# Patient Record
Sex: FEMALE | Race: OTHER | HISPANIC OR LATINO
[De-identification: names, ages, dates, MRNs, and addresses within clinical notes are randomized per-mention and may not be internally consistent; named-entity substitution may affect disease eponyms.]

---

## 2021-07-27 ENCOUNTER — APPOINTMENT (OUTPATIENT)
Dept: ORTHOPEDIC SURGERY | Facility: CLINIC | Age: 61
End: 2021-07-27

## 2021-07-27 NOTE — HISTORY OF PRESENT ILLNESS
[de-identified] : Ms. REMINGTON CORNELIUS is a 60 year female who presents to office complaining of right knee pain.\par \par All review of systems, family history, social history, surgical history, past medical history, medications, and allergies not previously stated as positive are negative. They were reviewed by me today with the patient and documented accordingly.

## 2021-11-10 ENCOUNTER — APPOINTMENT (OUTPATIENT)
Dept: BARIATRICS | Facility: CLINIC | Age: 61
End: 2021-11-10
Payer: MEDICAID

## 2021-11-10 VITALS
OXYGEN SATURATION: 99 % | TEMPERATURE: 97.6 F | BODY MASS INDEX: 36.33 KG/M2 | SYSTOLIC BLOOD PRESSURE: 150 MMHG | DIASTOLIC BLOOD PRESSURE: 76 MMHG | HEIGHT: 62 IN | HEART RATE: 75 BPM | WEIGHT: 197.44 LBS

## 2021-11-10 DIAGNOSIS — E11.9 TYPE 2 DIABETES MELLITUS W/OUT COMPLICATIONS: ICD-10-CM

## 2021-11-10 DIAGNOSIS — E78.00 PURE HYPERCHOLESTEROLEMIA, UNSPECIFIED: ICD-10-CM

## 2021-11-10 DIAGNOSIS — I10 ESSENTIAL (PRIMARY) HYPERTENSION: ICD-10-CM

## 2021-11-10 DIAGNOSIS — K21.9 GASTRO-ESOPHAGEAL REFLUX DISEASE W/OUT ESOPHAGITIS: ICD-10-CM

## 2021-11-10 PROCEDURE — 99203 OFFICE O/P NEW LOW 30 MIN: CPT

## 2021-11-10 NOTE — REVIEW OF SYSTEMS
[Patient Intake Form Reviewed] : Patient intake form was reviewed [Reflux/Heartburn] : reflux/heartburn [Negative] : Psychiatric

## 2021-11-10 NOTE — ASSESSMENT
[FreeTextEntry1] : She meets the NIH criteria for bariatric surgery and would like to have a laparoscopic sleeve gastrectomy. I have reviewed the risks and benefits of the procedure with the patient, and she understands this information fully.

## 2021-11-10 NOTE — PHYSICAL EXAM
[Obese, well nourished, in no acute distress] : obese, well nourished, in no acute distress [Normal] : affect appropriate [de-identified] : obese

## 2021-11-10 NOTE — HISTORY OF PRESENT ILLNESS
[de-identified] : Patient is a 61 year old female with along history of morbid obesity not responsive to diet and exercise. She has a BMI of 36. 1 and weight-related comorbidities including hypertension, coronary artery disease, type 2 diabetes mellitus,GERD and hypercholesterolemia.

## 2021-12-13 ENCOUNTER — APPOINTMENT (OUTPATIENT)
Dept: BARIATRICS | Facility: CLINIC | Age: 61
End: 2021-12-13
Payer: MEDICAID

## 2021-12-13 PROCEDURE — 98968 PH1 ASSMT&MGMT NQHP 21-30: CPT

## 2022-05-31 ENCOUNTER — RESULT REVIEW (OUTPATIENT)
Age: 62
End: 2022-05-31

## 2022-08-18 ENCOUNTER — APPOINTMENT (OUTPATIENT)
Dept: BARIATRICS | Facility: CLINIC | Age: 62
End: 2022-08-18

## 2022-08-18 VITALS
DIASTOLIC BLOOD PRESSURE: 81 MMHG | HEART RATE: 60 BPM | TEMPERATURE: 96.5 F | WEIGHT: 196.5 LBS | BODY MASS INDEX: 36.16 KG/M2 | OXYGEN SATURATION: 99 % | HEIGHT: 62 IN | SYSTOLIC BLOOD PRESSURE: 143 MMHG

## 2022-08-18 DIAGNOSIS — E66.01 MORBID (SEVERE) OBESITY DUE TO EXCESS CALORIES: ICD-10-CM

## 2022-08-18 PROCEDURE — 99204 OFFICE O/P NEW MOD 45 MIN: CPT

## 2022-08-19 NOTE — HISTORY OF PRESENT ILLNESS
[de-identified] : Patient is a 61 year old F with PMHx of obesity(BMI 35), DM,HLD and HTN and PSHx of  who is here today feeling well for initial bariatric consult. Has seen  previously. She is on 60 units insulin QPM, metformin and losartan. C/o of daily heartburn and is not on Omeprazole. \par We discussed at length surgical and non-surgical options and that non surgical approaches are unlikely to lead to long term, sustained weight loss. We also discussed that surgery alone is unlikely to be successful but should rather be seen as a tool for weight loss to be integrated with physical activity and nutritional counseling. The patient verbalized understanding and agrees to proceed with the evaluation. All risks of surgery were explained to the patient including the risks of leaks, infections, blood clots and death.\par \par

## 2022-08-19 NOTE — ASSESSMENT
[FreeTextEntry1] : Patient is a 61 year old F with PMHx of obesity(BMI 35), DM,HLD and HTN and PSHx of  who is here today feeling well for initial bariatric consult. I have explained all the risks and benefits of all the options of bariatric surgery. At this time, patient will see the coordinators and begin the bariatric workup.

## 2022-08-19 NOTE — PLAN
[FreeTextEntry1] : Will begin bariatric workup including sleep study to assess obesity-related disordered breathing.\par \par

## 2022-08-19 NOTE — END OF VISIT
[FreeTextEntry3] : All medical record entries made by the Scribe were at my, NANDO York , direction and personally dictated by me on 08/18/2022 . I have reviewed the chart and agree that the record accurately reflects my personal performance of the history, physical exam, assessment and plan. I have also personally directed, reviewed, and agreed with the chart.\par  [Time Spent: ___ minutes] : I have spent [unfilled] minutes of time on the encounter.

## 2022-09-08 ENCOUNTER — APPOINTMENT (OUTPATIENT)
Dept: BARIATRICS | Facility: CLINIC | Age: 62
End: 2022-09-08

## 2022-09-21 ENCOUNTER — APPOINTMENT (OUTPATIENT)
Dept: RADIOLOGY | Facility: HOSPITAL | Age: 62
End: 2022-09-21

## 2022-09-21 ENCOUNTER — OUTPATIENT (OUTPATIENT)
Dept: OUTPATIENT SERVICES | Facility: HOSPITAL | Age: 62
LOS: 1 days | End: 2022-09-21
Payer: COMMERCIAL

## 2022-09-21 PROCEDURE — 74240 X-RAY XM UPR GI TRC 1CNTRST: CPT | Mod: 26

## 2022-09-21 PROCEDURE — 74240 X-RAY XM UPR GI TRC 1CNTRST: CPT

## 2022-10-05 DIAGNOSIS — A04.8 OTHER SPECIFIED BACTERIAL INTESTINAL INFECTIONS: ICD-10-CM

## 2022-10-05 LAB
25(OH)D3 SERPL-MCNC: 38.6 NG/ML
A-TOCOPHEROL VIT E SERPL-MCNC: 13.7 MG/L
ALBUMIN SERPL ELPH-MCNC: 4.5 G/DL
ALP BLD-CCNC: 60 U/L
ALT SERPL-CCNC: 24 U/L
ANION GAP SERPL CALC-SCNC: 11 MMOL/L
APPEARANCE: CLEAR
AST SERPL-CCNC: 22 U/L
BACTERIA: NEGATIVE
BASOPHILS # BLD AUTO: 0.06 K/UL
BASOPHILS NFR BLD AUTO: 0.9 %
BETA+GAMMA TOCOPHEROL SERPL-MCNC: 1.3 MG/L
BILIRUB SERPL-MCNC: 0.2 MG/DL
BILIRUBIN URINE: NEGATIVE
BLOOD URINE: NEGATIVE
BUN SERPL-MCNC: 17 MG/DL
CA-I SERPL-SCNC: 5.1 MG/DL
CALCIUM SERPL-MCNC: 10.1 MG/DL
CALCIUM SERPL-MCNC: 10.1 MG/DL
CHLORIDE SERPL-SCNC: 103 MMOL/L
CHOLEST SERPL-MCNC: 181 MG/DL
CO2 SERPL-SCNC: 30 MMOL/L
COLOR: NORMAL
CREAT SERPL-MCNC: 0.65 MG/DL
EGFR: 99 ML/MIN/1.73M2
EOSINOPHIL # BLD AUTO: 0.16 K/UL
EOSINOPHIL NFR BLD AUTO: 2.5 %
ESTIMATED AVERAGE GLUCOSE: 189 MG/DL
FOLATE SERPL-MCNC: >20 NG/ML
GLUCOSE QUALITATIVE U: NEGATIVE
GLUCOSE SERPL-MCNC: 87 MG/DL
HBA1C MFR BLD HPLC: 8.2 %
HCG SERPL QL: NEGATIVE
HCT VFR BLD CALC: 38.2 %
HDLC SERPL-MCNC: 59 MG/DL
HGB BLD-MCNC: 12.6 G/DL
HYALINE CASTS: 0 /LPF
IMM GRANULOCYTES NFR BLD AUTO: 0 %
INR PPP: 0.98 RATIO
IRON SATN MFR SERPL: 27 %
IRON SERPL-MCNC: 84 UG/DL
KETONES URINE: NEGATIVE
LDLC SERPL CALC-MCNC: 95 MG/DL
LEUKOCYTE ESTERASE URINE: NEGATIVE
LYMPHOCYTES # BLD AUTO: 2.23 K/UL
LYMPHOCYTES NFR BLD AUTO: 35.2 %
MAN DIFF?: NORMAL
MCHC RBC-ENTMCNC: 30 PG
MCHC RBC-ENTMCNC: 33 GM/DL
MCV RBC AUTO: 91 FL
MICROSCOPIC-UA: NORMAL
MONOCYTES # BLD AUTO: 0.6 K/UL
MONOCYTES NFR BLD AUTO: 9.5 %
NEUTROPHILS # BLD AUTO: 3.29 K/UL
NEUTROPHILS NFR BLD AUTO: 51.9 %
NITRITE URINE: NEGATIVE
NONHDLC SERPL-MCNC: 122 MG/DL
PAPP-A SERPL-ACNC: 2 MIU/ML
PARATHYROID HORMONE INTACT: 27 PG/ML
PH URINE: 8.5
PLATELET # BLD AUTO: 293 K/UL
POTASSIUM SERPL-SCNC: 4.9 MMOL/L
PREALB SERPL NEPH-MCNC: 27 MG/DL
PROT SERPL-MCNC: 7.2 G/DL
PROTEIN URINE: NEGATIVE
PT BLD: 11.5 SEC
RBC # BLD: 4.2 M/UL
RBC # FLD: 13.7 %
RED BLOOD CELLS URINE: 0 /HPF
SODIUM SERPL-SCNC: 144 MMOL/L
SPECIFIC GRAVITY URINE: 1.02
SQUAMOUS EPITHELIAL CELLS: 7 /HPF
TIBC SERPL-MCNC: 307 UG/DL
TRIGL SERPL-MCNC: 135 MG/DL
TSH SERPL-ACNC: 3.6 UIU/ML
UIBC SERPL-MCNC: 223 UG/DL
UREA BREATH TEST QL: POSITIVE
UROBILINOGEN URINE: NORMAL
VIT A SERPL-MCNC: 54.2 UG/DL
VIT B1 SERPL-MCNC: 138.8 NMOL/L
VIT B12 SERPL-MCNC: 1047 PG/ML
WBC # FLD AUTO: 6.34 K/UL
WHITE BLOOD CELLS URINE: 0 /HPF
ZINC SERPL-MCNC: 86 UG/DL

## 2022-10-05 RX ORDER — METRONIDAZOLE 250 MG/1
250 TABLET ORAL EVERY 6 HOURS
Qty: 56 | Refills: 0 | Status: ACTIVE | COMMUNITY
Start: 2022-10-05 | End: 1900-01-01

## 2022-10-05 RX ORDER — TETRACYCLINE HYDROCHLORIDE 500 MG/1
500 CAPSULE ORAL EVERY 6 HOURS
Qty: 56 | Refills: 0 | Status: ACTIVE | COMMUNITY
Start: 2022-10-05 | End: 1900-01-01

## 2022-10-05 RX ORDER — OMEPRAZOLE 20 MG/1
20 CAPSULE, DELAYED RELEASE ORAL TWICE DAILY
Qty: 28 | Refills: 0 | Status: ACTIVE | COMMUNITY
Start: 2022-10-05 | End: 1900-01-01

## 2022-10-05 RX ORDER — BISMUTH SUBSALICYLATE 262 MG/1
262 TABLET, CHEWABLE ORAL 4 TIMES DAILY
Qty: 56 | Refills: 0 | Status: ACTIVE | COMMUNITY
Start: 2022-10-05 | End: 1900-01-01

## 2022-10-05 RX ORDER — FLUCONAZOLE 150 MG/1
150 TABLET ORAL DAILY
Qty: 5 | Refills: 0 | Status: ACTIVE | COMMUNITY
Start: 2022-10-05 | End: 1900-01-01

## 2022-10-10 ENCOUNTER — APPOINTMENT (OUTPATIENT)
Dept: BARIATRICS | Facility: CLINIC | Age: 62
End: 2022-10-10

## 2022-10-10 VITALS — WEIGHT: 194 LBS | HEIGHT: 62 IN | BODY MASS INDEX: 35.7 KG/M2

## 2022-10-10 PROCEDURE — 98968 PH1 ASSMT&MGMT NQHP 21-30: CPT

## 2022-11-10 ENCOUNTER — APPOINTMENT (OUTPATIENT)
Dept: BARIATRICS | Facility: CLINIC | Age: 62
End: 2022-11-10

## 2022-11-10 PROCEDURE — 98967 PH1 ASSMT&MGMT NQHP 11-20: CPT

## 2022-11-17 ENCOUNTER — APPOINTMENT (OUTPATIENT)
Dept: PULMONOLOGY | Facility: CLINIC | Age: 62
End: 2022-11-17

## 2022-11-17 VITALS
HEIGHT: 62 IN | BODY MASS INDEX: 36.25 KG/M2 | HEART RATE: 69 BPM | TEMPERATURE: 97.5 F | SYSTOLIC BLOOD PRESSURE: 149 MMHG | RESPIRATION RATE: 12 BRPM | DIASTOLIC BLOOD PRESSURE: 84 MMHG | WEIGHT: 197 LBS | OXYGEN SATURATION: 96 %

## 2022-11-17 DIAGNOSIS — Z82.5 FAMILY HISTORY OF ASTHMA AND OTHER CHRONIC LOWER RESPIRATORY DISEASES: ICD-10-CM

## 2022-11-17 DIAGNOSIS — Z01.818 ENCOUNTER FOR OTHER PREPROCEDURAL EXAMINATION: ICD-10-CM

## 2022-11-17 PROCEDURE — 99203 OFFICE O/P NEW LOW 30 MIN: CPT

## 2022-11-17 PROCEDURE — ZZZZZ: CPT

## 2022-11-21 NOTE — ASSESSMENT
[FreeTextEntry1] : 62 yof with significant cardiac history presenting for evaluation prior to bariatric surgery.\par \par Patient has no history or symptoms of pulmonary disease, no need for pulmonary testing. She has CXR done in [facility] in 8/24/2022 showing some bibasilar linear scaring, otherwise normal, scaring not seen on recent barium swallow catching lower lobes.\par However she is overweight and has lateral narrowing of her pharynx despite good mallampati score. \par We'll order HST to rule out any NELLIE prior to bariatric surgery. \par If she has moderate or severe NELLIE, treatment with cpap will be recommended.\par \par  RTC after HST.

## 2022-11-21 NOTE — PHYSICAL EXAM
[No Acute Distress] : no acute distress [Normal Oropharynx] : normal oropharynx [Normal Appearance] : normal appearance [Normal Rate/Rhythm] : normal rate/rhythm [No Resp Distress] : no resp distress [No Abnormalities] : no abnormalities [Not Tender] : not tender [Normal Gait] : normal gait [Normal Color/ Pigmentation] : normal color/ pigmentation [No Focal Deficits] : no focal deficits [Oriented x3] : oriented x3 [I] : Mallampati Class: I [Well Groomed] : well groomed [2+] : Right Tonsil: 2+ [Supple] : supple [No Neck Mass] : no neck mass [Normal S1, S2] : normal s1, s2 [Clear to Auscultation Bilaterally] : clear to auscultation bilaterally [Benign] : benign [Soft] : soft [No Clubbing] : no clubbing [No Cyanosis] : no cyanosis [Normal Affect] : normal affect [TextBox_44] : no cervical or supraclavicular lad [TextBox_105] : Bilateral trace edema

## 2022-11-21 NOTE — REVIEW OF SYSTEMS
[Recent Wt Gain (___ Lbs)] : ~T recent [unfilled] lb weight gain [Poor Appetite] : poor appetite [Arthralgias] : arthralgias [Diabetes] : diabetes [Negative] : Psychiatric [Fever] : no fever [Cough] : no cough [Chest Tightness] : no chest tightness [Sputum] : no sputum [Dyspnea] : no dyspnea [Pleuritic Pain] : no pleuritic pain [Wheezing] : no wheezing [Trauma/ Injury] : no trauma/ injury [Headache] : no headache [Focal Weakness] : no focal weakness [Dizziness] : no dizziness [Memory Loss] : no memory loss [TextBox_3] : 2 lbs in 1 month  [TextBox_94] : knee pain, and Left arm pain

## 2022-11-21 NOTE — HISTORY OF PRESENT ILLNESS
[Former] : former [< 20 pack-years] : < 20 pack-years [Never] : never [TextBox_4] : [place] ID: [id number]\par \par 62 yof with histoyr significant for CAD (stent X2 2008/2009 on plavix), HTN, HLD, GERD, and DM, very short term former smoker (3 months), preseting for pulmonary evaluation prior to planned bariatric surgery, gastric sleeve scheduled for 01/23. \par \par Patient has no pulmonary disease, history of pulmonary illnesses or pulmonary complains. She is able to walk 2 blocks, however have to take rest because of knee pain, only able to climb 5 steps secondary to knee problems. She does no report dyspnea at rest or on exertion even when she has to stop for pain. \par She snores sometimes when she is tired, does not have a bed partner. She denies feeling tiredness. No head aches or dry mouth upon awakening.\par Social Hx: \par Currently unemployed prior worked as a  \par Covid: Had all 3 vaccines with booster\par Flu: Refused. \par \par  [TextBox_11] : 2 cig [TextBox_13] : 2 months

## 2022-11-21 NOTE — REASON FOR VISIT
[Initial] : an initial visit [Pre-op Risk Stratification] : pre-op risk stratification [TextBox_13] : Dr. Marti

## 2022-12-14 VITALS — HEIGHT: 62 IN | WEIGHT: 196 LBS | BODY MASS INDEX: 36.07 KG/M2

## 2023-01-23 ENCOUNTER — APPOINTMENT (OUTPATIENT)
Dept: SLEEP CENTER | Facility: HOME HEALTH | Age: 63
End: 2023-01-23
Payer: MEDICAID

## 2023-01-23 ENCOUNTER — OUTPATIENT (OUTPATIENT)
Dept: OUTPATIENT SERVICES | Facility: HOSPITAL | Age: 63
LOS: 1 days | End: 2023-01-23
Payer: COMMERCIAL

## 2023-01-23 PROCEDURE — 95800 SLP STDY UNATTENDED: CPT

## 2023-01-23 PROCEDURE — 95800 SLP STDY UNATTENDED: CPT | Mod: 26

## 2023-01-25 DIAGNOSIS — G47.33 OBSTRUCTIVE SLEEP APNEA (ADULT) (PEDIATRIC): ICD-10-CM

## 2023-02-02 ENCOUNTER — APPOINTMENT (OUTPATIENT)
Dept: PULMONOLOGY | Facility: CLINIC | Age: 63
End: 2023-02-02
Payer: MEDICAID

## 2023-02-02 VITALS
HEART RATE: 71 BPM | WEIGHT: 196 LBS | RESPIRATION RATE: 12 BRPM | OXYGEN SATURATION: 97 % | BODY MASS INDEX: 36.07 KG/M2 | DIASTOLIC BLOOD PRESSURE: 80 MMHG | HEIGHT: 62 IN | SYSTOLIC BLOOD PRESSURE: 132 MMHG

## 2023-02-02 PROCEDURE — 99213 OFFICE O/P EST LOW 20 MIN: CPT

## 2023-02-06 NOTE — END OF VISIT
[Time Spent: ___ minutes] : I have spent [unfilled] minutes of time on the encounter. [FreeTextEntry3] : Np participated on patient's encounter.\par I, personally performed the evaluation and management (E/M) services for this established patient who presents today with (a) new problem(s)/exacerbation of (an) existing condition(s).  That E/M includes conducting the examination, assessing all new/exacerbated conditions, and establishing a new plan of care.  Today, my ACP/fellow was here to observe my evaluation and management services for this new problem/exacerbated condition to be followed going forward.\par \par

## 2023-02-06 NOTE — REASON FOR VISIT
[Initial] : an initial visit [Pre-op Risk Stratification] : pre-op risk stratification [Follow-Up] : a follow-up visit [TextBox_13] : Dr. Marti

## 2023-02-06 NOTE — ASSESSMENT
[FreeTextEntry1] : 62 yof with significant cardiac history presenting for evaluation prior to bariatric surgery.\par \par Patient has no history or symptoms of pulmonary disease, no need for pulmonary testing. She has CXR done in [facility] in 8/24/2022 showing some bibasilar linear scaring, otherwise normal, scaring not seen on recent barium swallow catching lower lobes.\par Her HST revealed moderate NELLIE with AHI of 16.8.\par Treatment is recommended. She was ordered autopap and was educated about compliance and need to initiate treatment prior to the procedure, \par She was instructed to start using autopap and call with any issues( poor mask fitting, pressure, humidification...).\par She will come ( with autopap) 4 weeks after initiation of treatment to check response to treatment prior to clearance for bariatric surgery.\par   \par \par \par \par

## 2023-02-06 NOTE — HISTORY OF PRESENT ILLNESS
[Never] : never [Former] : former [< 20 pack-years] : < 20 pack-years [Obstructive Sleep Apnea] : obstructive sleep apnea [Home] : home [TextBox_4] : [facility] ID: [id number]\par \par 62 yof with history significant for CAD (stent X2 2008/2009 on plavix), HTN, HLD, GERD, and DM, very short term former smoker (3 months), referred for pulmonary evaluation prior to planned bariatric surgery, gastric sleeve scheduled for 01/23. \par \par Patient has no pulmonary disease, history of pulmonary illnesses or pulmonary complains. She is able to walk 2 blocks, however have to take rest because of knee pain, only able to climb 5 steps secondary to knee problems. She does no report dyspnea at rest or on exertion even when she has to stop for pain. \par She snores sometimes when she is tired, does not have a bed partner. She denies feeling tiredness. No head aches or dry mouth upon awakening.\par Social Hx: \par Currently unemployed prior worked as a  \par Covid: Had all 3 vaccines with booster\par Flu: Refused. \par \par 02/02/2023\par Following up after HST done on 01/23/23 for bariatric surgery. Pt reports in the same state of health as the last visit. She denies any chest pain, SOB, nausea, vomiting. She had URI 2 weeks ago, PCP started her on antibiotics which she completed. \par  [TextBox_11] : 2 cig [TextBox_13] : 2 months [YearQuit] : 2 months [TextBox_100] : 1/23/2023 [TextBox_108] : 16.8 [TextBox_116] : 75% [TextBox_120] : 2% of study below 88%

## 2023-02-06 NOTE — HISTORY OF PRESENT ILLNESS
[Never] : never [Former] : former [< 20 pack-years] : < 20 pack-years [Obstructive Sleep Apnea] : obstructive sleep apnea [Home] : home [TextBox_4] : [place] ID: [id number]\par \par 62 yof with history significant for CAD (stent X2 2008/2009 on plavix), HTN, HLD, GERD, and DM, very short term former smoker (3 months), referred for pulmonary evaluation prior to planned bariatric surgery, gastric sleeve scheduled for 01/23. \par \par Patient has no pulmonary disease, history of pulmonary illnesses or pulmonary complains. She is able to walk 2 blocks, however have to take rest because of knee pain, only able to climb 5 steps secondary to knee problems. She does no report dyspnea at rest or on exertion even when she has to stop for pain. \par She snores sometimes when she is tired, does not have a bed partner. She denies feeling tiredness. No head aches or dry mouth upon awakening.\par Social Hx: \par Currently unemployed prior worked as a  \par Covid: Had all 3 vaccines with booster\par Flu: Refused. \par \par 02/02/2023\par Following up after HST done on 01/23/23 for bariatric surgery. Pt reports in the same state of health as the last visit. She denies any chest pain, SOB, nausea, vomiting. She had URI 2 weeks ago, PCP started her on antibiotics which she completed. \par  [TextBox_11] : 2 cig [TextBox_13] : 2 months [YearQuit] : 2 months [TextBox_100] : 1/23/2023 [TextBox_108] : 16.8 [TextBox_116] : 75% [TextBox_120] : 2% of study below 88%

## 2023-02-06 NOTE — REVIEW OF SYSTEMS
[Recent Wt Gain (___ Lbs)] : ~T recent [unfilled] lb weight gain [Arthralgias] : arthralgias [Diabetes] : diabetes [Negative] : Psychiatric [Fever] : no fever [Poor Appetite] : no poor appetite [Cough] : no cough [Chest Tightness] : no chest tightness [Sputum] : no sputum [Dyspnea] : no dyspnea [Pleuritic Pain] : no pleuritic pain [Wheezing] : no wheezing [Trauma/ Injury] : no trauma/ injury [Headache] : no headache [Focal Weakness] : no focal weakness [Dizziness] : no dizziness [Memory Loss] : no memory loss [TextBox_3] : 2 lbs in 1 month  [TextBox_94] : knee pain, and Left arm pain

## 2023-02-06 NOTE — PHYSICAL EXAM
[No Acute Distress] : no acute distress [Well Groomed] : well groomed [Normal Oropharynx] : normal oropharynx [I] : Mallampati Class: I [2+] : Right Tonsil: 2+ [Normal Appearance] : normal appearance [Supple] : supple [No Neck Mass] : no neck mass [Normal Rate/Rhythm] : normal rate/rhythm [Normal S1, S2] : normal s1, s2 [No Resp Distress] : no resp distress [Clear to Auscultation Bilaterally] : clear to auscultation bilaterally [No Abnormalities] : no abnormalities [Benign] : benign [Not Tender] : not tender [Soft] : soft [Normal Gait] : normal gait [No Clubbing] : no clubbing [No Cyanosis] : no cyanosis [Normal Color/ Pigmentation] : normal color/ pigmentation [No Focal Deficits] : no focal deficits [Oriented x3] : oriented x3 [Normal Affect] : normal affect [TextBox_44] : no cervical or supraclavicular lad [TextBox_105] : Bilateral trace edema

## 2023-02-16 ENCOUNTER — APPOINTMENT (OUTPATIENT)
Dept: PULMONOLOGY | Facility: CLINIC | Age: 63
End: 2023-02-16

## 2023-02-16 DIAGNOSIS — G47.33 OBSTRUCTIVE SLEEP APNEA (ADULT) (PEDIATRIC): ICD-10-CM

## 2023-02-16 DIAGNOSIS — G47.30 SLEEP APNEA, UNSPECIFIED: ICD-10-CM

## 2023-02-27 ENCOUNTER — TRANSCRIPTION ENCOUNTER (OUTPATIENT)
Age: 63
End: 2023-02-27

## 2023-02-27 NOTE — ASU PATIENT PROFILE, ADULT - VISION (WITH CORRECTIVE LENSES IF THE PATIENT USUALLY WEARS THEM):
Normal vision: sees adequately in most situations; can see medication labels, newsprint Readers/Partially impaired: cannot see medication labels or newsprint, but can see obstacles in path, and the surrounding layout; can count fingers at arm's length

## 2023-02-27 NOTE — ASU PATIENT PROFILE, ADULT - NSICDXPASTMEDICALHX_GEN_ALL_CORE_FT
PAST MEDICAL HISTORY:  2019 novel coronavirus disease (COVID-19) 2 years ago    Asthma     Diabetes     High blood pressure     High cholesterol     Hypothyroid      PAST MEDICAL HISTORY:  2019 novel coronavirus disease (COVID-19) 2 years ago    Asthma last episode about 3 mos ago R/T cold   ( post covid   Dx)    Diabetes     High blood pressure     High cholesterol     Hypothyroid

## 2023-02-27 NOTE — ASU PATIENT PROFILE, ADULT - NSICDXPASTSURGICALHX_GEN_ALL_CORE_FT
PAST SURGICAL HISTORY:  H/O knee surgery x2    H/O varicose veins     History of surgery on arm     Stented coronary artery x2    Uterine mass      PAST SURGICAL HISTORY:  H/O knee surgery x2  Scopes only    H/O varicose veins     History of surgery on arm     Stented coronary artery x2    Uterine mass

## 2023-02-27 NOTE — ASU PATIENT PROFILE, ADULT - FALL HARM RISK - RISK INTERVENTIONS

## 2023-02-28 ENCOUNTER — TRANSCRIPTION ENCOUNTER (OUTPATIENT)
Age: 63
End: 2023-02-28

## 2023-02-28 ENCOUNTER — OUTPATIENT (OUTPATIENT)
Dept: OUTPATIENT SERVICES | Facility: HOSPITAL | Age: 63
LOS: 1 days | Discharge: HOME | End: 2023-02-28
Payer: MEDICAID

## 2023-02-28 VITALS
WEIGHT: 194.01 LBS | SYSTOLIC BLOOD PRESSURE: 166 MMHG | TEMPERATURE: 98 F | DIASTOLIC BLOOD PRESSURE: 75 MMHG | OXYGEN SATURATION: 97 % | RESPIRATION RATE: 15 BRPM | HEART RATE: 82 BPM | HEIGHT: 61 IN

## 2023-02-28 VITALS
HEART RATE: 78 BPM | TEMPERATURE: 98 F | DIASTOLIC BLOOD PRESSURE: 65 MMHG | SYSTOLIC BLOOD PRESSURE: 140 MMHG | OXYGEN SATURATION: 98 %

## 2023-02-28 DIAGNOSIS — Z86.79 PERSONAL HISTORY OF OTHER DISEASES OF THE CIRCULATORY SYSTEM: Chronic | ICD-10-CM

## 2023-02-28 DIAGNOSIS — Z98.890 OTHER SPECIFIED POSTPROCEDURAL STATES: Chronic | ICD-10-CM

## 2023-02-28 DIAGNOSIS — N85.8 OTHER SPECIFIED NONINFLAMMATORY DISORDERS OF UTERUS: Chronic | ICD-10-CM

## 2023-02-28 DIAGNOSIS — Z95.5 PRESENCE OF CORONARY ANGIOPLASTY IMPLANT AND GRAFT: Chronic | ICD-10-CM

## 2023-02-28 LAB
GLUCOSE BLDC GLUCOMTR-MCNC: 73 MG/DL — SIGNIFICANT CHANGE UP (ref 70–99)
GLUCOSE BLDC GLUCOMTR-MCNC: 83 MG/DL — SIGNIFICANT CHANGE UP (ref 70–99)

## 2023-02-28 PROCEDURE — 88305 TISSUE EXAM BY PATHOLOGIST: CPT | Mod: 26

## 2023-02-28 DEVICE — MYOSURE TISSUE REMOVAL DEVICE LITE: Type: IMPLANTABLE DEVICE | Status: FUNCTIONAL

## 2023-02-28 DEVICE — MYOSURE TISSUE REMOVAL DEVICE REACH: Type: IMPLANTABLE DEVICE | Status: FUNCTIONAL

## 2023-02-28 DEVICE — MYOSURE TISSUE REMOVAL DEVICE XL: Type: IMPLANTABLE DEVICE | Status: FUNCTIONAL

## 2023-02-28 RX ORDER — ONDANSETRON 8 MG/1
4 TABLET, FILM COATED ORAL ONCE
Refills: 0 | Status: DISCONTINUED | OUTPATIENT
Start: 2023-02-28 | End: 2023-02-28

## 2023-02-28 RX ORDER — SODIUM CHLORIDE 9 MG/ML
1000 INJECTION, SOLUTION INTRAVENOUS
Refills: 0 | Status: DISCONTINUED | OUTPATIENT
Start: 2023-02-28 | End: 2023-02-28

## 2023-02-28 RX ORDER — ACETAMINOPHEN 500 MG
1000 TABLET ORAL ONCE
Refills: 0 | Status: COMPLETED | OUTPATIENT
Start: 2023-02-28 | End: 2023-02-28

## 2023-02-28 RX ORDER — FENTANYL CITRATE 50 UG/ML
25 INJECTION INTRAVENOUS
Refills: 0 | Status: DISCONTINUED | OUTPATIENT
Start: 2023-02-28 | End: 2023-02-28

## 2023-02-28 RX ADMIN — Medication 1000 MILLIGRAM(S): at 08:11

## 2023-02-28 NOTE — BRIEF OPERATIVE NOTE - OPERATION/FINDINGS
Cervix serially dilated to #16 dilator. Diagnostic hysteroscopy performed, approx 2cm posterior pedunculated endometrial polyp visualized and partially dissected from stalk with grasper. Resected w/ myosure device. Both ostia identified. Excellent hemostasis

## 2023-02-28 NOTE — ASU PREOP CHECKLIST - 1.
FS 72  Dr. Owens informed Pt asymptomatic IV D5 1/2 ns clipped to chart incase needed S/S of hypoglycemia reviewed with Katie harvey within reach

## 2023-02-28 NOTE — BRIEF OPERATIVE NOTE - NSICDXBRIEFPROCEDURE_GEN_ALL_CORE_FT
PROCEDURES:  Hysteroscopy with polypectomy of uterus 28-Feb-2023 10:58:53  Fernie Rivas  D&C (dilatation and curettage, scraping of uterus) 28-Feb-2023 10:59:02  Fernie Rivas

## 2023-02-28 NOTE — ASU DISCHARGE PLAN (ADULT/PEDIATRIC) - CARE PROVIDER_API CALL
Sherlyn Stewart)  Obstetrics and Gynecology  [address], [address]  [place] [address]  Phone: [phone]  Fax: [phone]  Established Patient  Follow Up Time:

## 2023-02-28 NOTE — ASU DISCHARGE PLAN (ADULT/PEDIATRIC) - NS MD DC FALL RISK RISK
For information on Fall & Injury Prevention, visit: [email or web address] OR  [email or web address] OR  https://www.cdc.gov/steadi/patient.html

## 2023-03-01 LAB — SURGICAL PATHOLOGY STUDY: SIGNIFICANT CHANGE UP

## 2023-03-03 VITALS — WEIGHT: 195 LBS | HEIGHT: 62 IN | BODY MASS INDEX: 35.88 KG/M2

## 2023-05-19 PROBLEM — E11.9 TYPE 2 DIABETES MELLITUS WITHOUT COMPLICATIONS: Chronic | Status: ACTIVE | Noted: 2023-02-27

## 2023-05-19 PROBLEM — J45.909 UNSPECIFIED ASTHMA, UNCOMPLICATED: Chronic | Status: ACTIVE | Noted: 2023-02-27

## 2023-05-19 PROBLEM — E03.9 HYPOTHYROIDISM, UNSPECIFIED: Chronic | Status: ACTIVE | Noted: 2023-02-27

## 2023-05-19 PROBLEM — U07.1 COVID-19: Chronic | Status: ACTIVE | Noted: 2023-02-27

## 2023-05-19 PROBLEM — I10 ESSENTIAL (PRIMARY) HYPERTENSION: Chronic | Status: ACTIVE | Noted: 2023-02-27

## 2023-05-19 PROBLEM — E78.00 PURE HYPERCHOLESTEROLEMIA, UNSPECIFIED: Chronic | Status: ACTIVE | Noted: 2023-02-27

## 2023-06-08 ENCOUNTER — APPOINTMENT (OUTPATIENT)
Dept: PULMONOLOGY | Facility: CLINIC | Age: 63
End: 2023-06-08
Payer: MEDICAID

## 2023-06-08 VITALS
RESPIRATION RATE: 12 BRPM | HEIGHT: 62 IN | WEIGHT: 199 LBS | SYSTOLIC BLOOD PRESSURE: 154 MMHG | OXYGEN SATURATION: 97 % | DIASTOLIC BLOOD PRESSURE: 83 MMHG | TEMPERATURE: 97.2 F | HEART RATE: 79 BPM | BODY MASS INDEX: 36.62 KG/M2

## 2023-06-08 PROCEDURE — 99213 OFFICE O/P EST LOW 20 MIN: CPT

## 2023-06-08 NOTE — HISTORY OF PRESENT ILLNESS
[Never] : never [Obstructive Sleep Apnea] : obstructive sleep apnea [Home] : home [APAP:] : APAP [Nasal pillow] : nasal pillow [TextBox_4] : ID#[id number]\par \par 62 yof with history significant for CAD (stent X2 2008/2009 on plavix), HTN, HLD, GERD, and DM, very short term former smoker (3 months), referred for pulmonary evaluation prior to planned bariatric surgery, gastric sleeve scheduled for 01/23. \par \par Patient has no pulmonary disease, history of pulmonary illnesses or pulmonary complains. She is able to walk 2 blocks, however have to take rest because of knee pain, only able to climb 5 steps secondary to knee problems. She does no report dyspnea at rest or on exertion even when she has to stop for pain. \par She snores sometimes when she is tired, does not have a bed partner. She denies feeling tiredness. No head aches or dry mouth upon awakening.\par Social Hx: \par Currently unemployed prior worked as a  \par Covid: Had all 3 vaccines with booster\par Flu: Refused. \par \par 02/02/2023\par Following up after HST done on 01/23/23 for bariatric surgery. Pt reports in the same state of health as the last visit. She denies any chest pain, SOB, nausea, vomiting. She had URI 2 weeks ago, PCP started her on antibiotics which she completed. \par \par 6/8/23:\par Presents today for evaluation of treatment effect and compliance.  Was told she needs to use machine for 4 hrs. Using most of the night.. Feels her sleep is better and feels refreshed when she wakes up. No HA. Fatigue improved. She wishes to continue using it as her sleep quality significantly improved with it. [TextBox_11] : 2cig [TextBox_13] : 2mo [TextBox_100] : 1/23/2023 [TextBox_108] : 16.8 [TextBox_116] : 75% [TextBox_120] : 2% of study below 88% [TextBox_127] : last 1 month [TextBox_133] : 20/30 [TextBox_141] : 3.6 [TextBox_147] : 2.1 [TextBox_158] : messi

## 2023-06-08 NOTE — PHYSICAL EXAM
[No Acute Distress] : no acute distress [Well Groomed] : well groomed [Normal Oropharynx] : normal oropharynx [No Resp Distress] : no resp distress [No Abnormalities] : no abnormalities [Normal Gait] : normal gait [No Clubbing] : no clubbing [No Edema] : no edema [Normal Color/ Pigmentation] : normal color/ pigmentation [Oriented x3] : oriented x3 [Normal Appearance] : normal appearance [Normal Rate/Rhythm] : normal rate/rhythm [Normal S1, S2] : normal s1, s2 [Clear to Auscultation Bilaterally] : clear to auscultation bilaterally

## 2023-06-08 NOTE — ASSESSMENT
[FreeTextEntry1] : 62 yof with significant cardiac history presenting for evaluation prior to bariatric surgery.\par \par Patient has no history or symptoms of pulmonary disease, no need for pulmonary testing. She has CXR done in [place] in 8/24/2022 showing some bibasilar linear scaring, otherwise normal, scaring not seen on recent barium swallow catching lower lobes.\par Her HST revealed moderate NELLIE with AHI of 16.8.\par She has been using autopap with averaged pressure of 7.3cm H2O. He residual AHI is 2.1.\par She is complaint, however she misunderstood instruction when company brought autopap. She though she was supposed to use it 4 hrs a night and not more. She starts night with use and then takes it off after 4 hrs, in the middle of night. \par Usage instruction were clarified, she is aware now that  using it for entire night is better for her health. She will use autoap the whole night.\par She is optimized from respiratory and sleep point of view to undergo bariatric procedure. She should use her autoap during hospitalization and continue routine use after that as well.\par Office follow up 6 months after bariatric procedure.\par \par   \par \par \par \par

## 2023-06-08 NOTE — REVIEW OF SYSTEMS
[Negative] : Psychiatric [Fatigue] : no fatigue [Recent Wt Gain (___ Lbs)] : ~T no recent weight gain [Recent Wt Loss (___ Lbs)] : ~T no recent weight loss

## 2023-06-08 NOTE — CONSULT LETTER
[Dear  ___] : Dear  [unfilled], [Consult Letter:] : I had the pleasure of evaluating your patient, [unfilled]. [( Thank you for referring [unfilled] for consultation for _____ )] : Thank you for referring [unfilled] for consultation for [unfilled] [Please see my note below.] : Please see my note below. [Consult Closing:] : Thank you very much for allowing me to participate in the care of this patient.  If you have any questions, please do not hesitate to contact me. [FreeTextEntry3] : Paloma Serrato MD

## 2023-06-15 ENCOUNTER — APPOINTMENT (OUTPATIENT)
Dept: BARIATRICS | Facility: CLINIC | Age: 63
End: 2023-06-15
Payer: MEDICAID

## 2023-06-15 VITALS
HEIGHT: 62 IN | DIASTOLIC BLOOD PRESSURE: 83 MMHG | TEMPERATURE: 97.3 F | OXYGEN SATURATION: 98 % | BODY MASS INDEX: 36.25 KG/M2 | HEART RATE: 83 BPM | SYSTOLIC BLOOD PRESSURE: 160 MMHG | WEIGHT: 197 LBS

## 2023-06-15 PROCEDURE — 99214 OFFICE O/P EST MOD 30 MIN: CPT

## 2023-06-15 NOTE — ASSESSMENT
[FreeTextEntry1] : Patient is a 62 year old F with PMHx of obesity(BMI 36),CAD(stent x 2 in /, ON PLAVIX),  DM,HLD and HTN and PSHx of  who is here today feeling well for final bariatric visit. Interested in VSG. Discussed the procedure of VSG in great detail along with risks and benefits. The importance of maintaining a healthy diet and physical activity emphasized. Patient understands the need to be compliant to vitamins and supplements for lifetime. Patient to stop Plavix 7 days prior to the surgery; will contact cardiology to discuss if possible to continue with ASA 81 mg. \par \par

## 2023-06-15 NOTE — HISTORY OF PRESENT ILLNESS
[de-identified] : Patient is a 62 year old F with PMHx of obesity(BMI 36),CAD(stent x 2 in /, ON PLAVIX),  DM,HLD and HTN and PSHx of  who is here today feeling well for final bariatric visit. Interested in VSG. Discussed the procedure of VSG in great detail along with risks and benefits. The importance of maintaining a healthy diet and physical activity emphasized. Patient understands the need to be compliant to vitamins and supplements for lifetime. Patient to stop Plavix 7 days prior to the surgery; will contact cardiology to discuss if possible to continue with ASA 81 mg. \par

## 2023-06-15 NOTE — PLAN
[FreeTextEntry1] : - Will schedule for VSG \par - Stop Plavix 7 days prior to the surgery; will contact cardiology to discuss if possible to continue with ASA 81 mg.

## 2023-06-15 NOTE — END OF VISIT
[Time Spent: ___ minutes] : I have spent [unfilled] minutes of time on the encounter. [FreeTextEntry3] : All medical record entries made by the Scribe were at my, NANDO York , direction and personally dictated by me on 06/15/2023 . I have reviewed the chart and agree that the record accurately reflects my personal performance of the history, physical exam, assessment and plan. I have also personally directed, reviewed, and agreed with the chart.\par

## 2023-07-11 ENCOUNTER — NON-APPOINTMENT (OUTPATIENT)
Age: 63
End: 2023-07-11

## 2023-07-18 ENCOUNTER — TRANSCRIPTION ENCOUNTER (OUTPATIENT)
Age: 63
End: 2023-07-18

## 2023-07-18 VITALS
DIASTOLIC BLOOD PRESSURE: 62 MMHG | HEIGHT: 60 IN | SYSTOLIC BLOOD PRESSURE: 101 MMHG | OXYGEN SATURATION: 98 % | RESPIRATION RATE: 16 BRPM | TEMPERATURE: 97 F | HEART RATE: 64 BPM | WEIGHT: 198.2 LBS

## 2023-07-18 RX ORDER — SIMVASTATIN 20 MG/1
1 TABLET, FILM COATED ORAL
Qty: 0 | Refills: 0 | DISCHARGE

## 2023-07-18 RX ORDER — CLOPIDOGREL BISULFATE 75 MG/1
0 TABLET, FILM COATED ORAL
Qty: 0 | Refills: 0 | DISCHARGE

## 2023-07-18 NOTE — PRE-OP CHECKLIST - SELECT TESTS ORDERED
CBC/CMP/PT/PTT/INR/Urinalysis/EKG/CXR BS - 240/CBC/CMP/PT/PTT/INR/Urinalysis/EKG/CXR/POCT Blood Glucose

## 2023-07-18 NOTE — PATIENT PROFILE ADULT - FALL HARM RISK - HARM RISK INTERVENTIONS

## 2023-07-19 ENCOUNTER — INPATIENT (INPATIENT)
Facility: HOSPITAL | Age: 63
LOS: 1 days | Discharge: HOME | DRG: 620 | End: 2023-07-21
Attending: GENERAL ACUTE CARE HOSPITAL | Admitting: GENERAL ACUTE CARE HOSPITAL
Payer: COMMERCIAL

## 2023-07-19 ENCOUNTER — RESULT REVIEW (OUTPATIENT)
Age: 63
End: 2023-07-19

## 2023-07-19 ENCOUNTER — APPOINTMENT (OUTPATIENT)
Dept: BARIATRICS | Facility: HOSPITAL | Age: 63
End: 2023-07-19
Payer: MEDICAID

## 2023-07-19 DIAGNOSIS — Z98.890 OTHER SPECIFIED POSTPROCEDURAL STATES: Chronic | ICD-10-CM

## 2023-07-19 DIAGNOSIS — Z95.5 PRESENCE OF CORONARY ANGIOPLASTY IMPLANT AND GRAFT: Chronic | ICD-10-CM

## 2023-07-19 DIAGNOSIS — N85.8 OTHER SPECIFIED NONINFLAMMATORY DISORDERS OF UTERUS: Chronic | ICD-10-CM

## 2023-07-19 DIAGNOSIS — Z86.79 PERSONAL HISTORY OF OTHER DISEASES OF THE CIRCULATORY SYSTEM: Chronic | ICD-10-CM

## 2023-07-19 LAB
ANION GAP SERPL CALC-SCNC: 10 MMOL/L — SIGNIFICANT CHANGE UP (ref 5–17)
ANION GAP SERPL CALC-SCNC: 9 MMOL/L — SIGNIFICANT CHANGE UP (ref 5–17)
BASE EXCESS BLDA CALC-SCNC: -2.7 MMOL/L — LOW (ref -2–3)
BLD GP AB SCN SERPL QL: NEGATIVE — SIGNIFICANT CHANGE UP
BUN SERPL-MCNC: 32 MG/DL — HIGH (ref 7–23)
BUN SERPL-MCNC: 37 MG/DL — HIGH (ref 7–23)
CA-I BLDA-SCNC: 1.2 MMOL/L — SIGNIFICANT CHANGE UP (ref 1.15–1.33)
CALCIUM SERPL-MCNC: 8.7 MG/DL — SIGNIFICANT CHANGE UP (ref 8.4–10.5)
CALCIUM SERPL-MCNC: 8.8 MG/DL — SIGNIFICANT CHANGE UP (ref 8.4–10.5)
CHLORIDE SERPL-SCNC: 104 MMOL/L — SIGNIFICANT CHANGE UP (ref 96–108)
CHLORIDE SERPL-SCNC: 104 MMOL/L — SIGNIFICANT CHANGE UP (ref 96–108)
CO2 BLDA-SCNC: 25 MMOL/L — HIGH (ref 19–24)
CO2 SERPL-SCNC: 25 MMOL/L — SIGNIFICANT CHANGE UP (ref 22–31)
CO2 SERPL-SCNC: 26 MMOL/L — SIGNIFICANT CHANGE UP (ref 22–31)
COHGB MFR BLDA: 1.6 % — SIGNIFICANT CHANGE UP
CREAT SERPL-MCNC: 1.92 MG/DL — HIGH (ref 0.5–1.3)
CREAT SERPL-MCNC: 2.03 MG/DL — HIGH (ref 0.5–1.3)
EGFR: 27 ML/MIN/1.73M2 — LOW
EGFR: 29 ML/MIN/1.73M2 — LOW
GLUCOSE BLDA-MCNC: 172 MG/DL — HIGH (ref 70–99)
GLUCOSE BLDC GLUCOMTR-MCNC: 109 MG/DL — HIGH (ref 70–99)
GLUCOSE BLDC GLUCOMTR-MCNC: 129 MG/DL — HIGH (ref 70–99)
GLUCOSE BLDC GLUCOMTR-MCNC: 153 MG/DL — HIGH (ref 70–99)
GLUCOSE BLDC GLUCOMTR-MCNC: 240 MG/DL — HIGH (ref 70–99)
GLUCOSE BLDC GLUCOMTR-MCNC: 68 MG/DL — LOW (ref 70–99)
GLUCOSE BLDC GLUCOMTR-MCNC: 70 MG/DL — SIGNIFICANT CHANGE UP (ref 70–99)
GLUCOSE SERPL-MCNC: 161 MG/DL — HIGH (ref 70–99)
GLUCOSE SERPL-MCNC: 62 MG/DL — LOW (ref 70–99)
HCO3 BLDA-SCNC: 24 MMOL/L — SIGNIFICANT CHANGE UP (ref 21–28)
HCT VFR BLD CALC: 33.5 % — LOW (ref 34.5–45)
HCT VFR BLD CALC: 35.4 % — SIGNIFICANT CHANGE UP (ref 34.5–45)
HGB BLD-MCNC: 11.7 G/DL — SIGNIFICANT CHANGE UP (ref 11.5–15.5)
HGB BLD-MCNC: 12.1 G/DL — SIGNIFICANT CHANGE UP (ref 11.5–15.5)
HGB BLDA-MCNC: 10.9 G/DL — LOW (ref 11.7–16.1)
LACTATE SERPL-SCNC: 1.7 MMOL/L — SIGNIFICANT CHANGE UP (ref 0.5–2)
MCHC RBC-ENTMCNC: 31 PG — SIGNIFICANT CHANGE UP (ref 27–34)
MCHC RBC-ENTMCNC: 31.5 PG — SIGNIFICANT CHANGE UP (ref 27–34)
MCHC RBC-ENTMCNC: 34.2 GM/DL — SIGNIFICANT CHANGE UP (ref 32–36)
MCHC RBC-ENTMCNC: 34.9 GM/DL — SIGNIFICANT CHANGE UP (ref 32–36)
MCV RBC AUTO: 90.3 FL — SIGNIFICANT CHANGE UP (ref 80–100)
MCV RBC AUTO: 90.8 FL — SIGNIFICANT CHANGE UP (ref 80–100)
METHGB MFR BLDA: 0.7 % — SIGNIFICANT CHANGE UP
NRBC # BLD: 0 /100 WBCS — SIGNIFICANT CHANGE UP (ref 0–0)
NRBC # BLD: 0 /100 WBCS — SIGNIFICANT CHANGE UP (ref 0–0)
OXYHGB MFR BLDA: 80.8 % — LOW (ref 90–95)
PCO2 BLDA: 47 MMHG — HIGH (ref 32–45)
PH BLDA: 7.31 — LOW (ref 7.35–7.45)
PLATELET # BLD AUTO: 232 K/UL — SIGNIFICANT CHANGE UP (ref 150–400)
PLATELET # BLD AUTO: 233 K/UL — SIGNIFICANT CHANGE UP (ref 150–400)
PO2 BLDA: 49 MMHG — CRITICAL LOW (ref 83–108)
POTASSIUM BLDA-SCNC: 4.1 MMOL/L — SIGNIFICANT CHANGE UP (ref 3.5–5.1)
POTASSIUM SERPL-MCNC: 3.8 MMOL/L — SIGNIFICANT CHANGE UP (ref 3.5–5.3)
POTASSIUM SERPL-MCNC: 4.8 MMOL/L — SIGNIFICANT CHANGE UP (ref 3.5–5.3)
POTASSIUM SERPL-SCNC: 3.8 MMOL/L — SIGNIFICANT CHANGE UP (ref 3.5–5.3)
POTASSIUM SERPL-SCNC: 4.8 MMOL/L — SIGNIFICANT CHANGE UP (ref 3.5–5.3)
RBC # BLD: 3.71 M/UL — LOW (ref 3.8–5.2)
RBC # BLD: 3.9 M/UL — SIGNIFICANT CHANGE UP (ref 3.8–5.2)
RBC # FLD: 12.3 % — SIGNIFICANT CHANGE UP (ref 10.3–14.5)
RBC # FLD: 12.3 % — SIGNIFICANT CHANGE UP (ref 10.3–14.5)
RH IG SCN BLD-IMP: POSITIVE — SIGNIFICANT CHANGE UP
SAO2 % BLDA: 82.7 % — LOW (ref 94–98)
SODIUM BLDA-SCNC: 132 MMOL/L — LOW (ref 136–145)
SODIUM SERPL-SCNC: 139 MMOL/L — SIGNIFICANT CHANGE UP (ref 135–145)
SODIUM SERPL-SCNC: 139 MMOL/L — SIGNIFICANT CHANGE UP (ref 135–145)
WBC # BLD: 6.18 K/UL — SIGNIFICANT CHANGE UP (ref 3.8–10.5)
WBC # BLD: 8.25 K/UL — SIGNIFICANT CHANGE UP (ref 3.8–10.5)
WBC # FLD AUTO: 6.18 K/UL — SIGNIFICANT CHANGE UP (ref 3.8–10.5)
WBC # FLD AUTO: 8.25 K/UL — SIGNIFICANT CHANGE UP (ref 3.8–10.5)

## 2023-07-19 PROCEDURE — 43775 LAP SLEEVE GASTRECTOMY: CPT

## 2023-07-19 PROCEDURE — S2900 ROBOTIC SURGICAL SYSTEM: CPT | Mod: NC

## 2023-07-19 PROCEDURE — 43775 LAP SLEEVE GASTRECTOMY: CPT | Mod: AS

## 2023-07-19 PROCEDURE — 88307 TISSUE EXAM BY PATHOLOGIST: CPT | Mod: 26

## 2023-07-19 DEVICE — XI STAPLER SUREFORM RELOAD 60 BLUE: Type: IMPLANTABLE DEVICE | Status: FUNCTIONAL

## 2023-07-19 RX ORDER — LEVOTHYROXINE SODIUM 125 MCG
25 TABLET ORAL DAILY
Refills: 0 | Status: DISCONTINUED | OUTPATIENT
Start: 2023-07-19 | End: 2023-07-19

## 2023-07-19 RX ORDER — DEXTROSE 50 % IN WATER 50 %
25 SYRINGE (ML) INTRAVENOUS ONCE
Refills: 0 | Status: DISCONTINUED | OUTPATIENT
Start: 2023-07-19 | End: 2023-07-21

## 2023-07-19 RX ORDER — SODIUM CHLORIDE 9 MG/ML
1000 INJECTION, SOLUTION INTRAVENOUS
Refills: 0 | Status: DISCONTINUED | OUTPATIENT
Start: 2023-07-19 | End: 2023-07-21

## 2023-07-19 RX ORDER — SCOPALAMINE 1 MG/3D
1 PATCH, EXTENDED RELEASE TRANSDERMAL ONCE
Refills: 0 | Status: COMPLETED | OUTPATIENT
Start: 2023-07-19 | End: 2023-07-19

## 2023-07-19 RX ORDER — KETOTIFEN FUMARATE 0.34 MG/ML
1 SOLUTION OPHTHALMIC
Qty: 0 | Refills: 0 | DISCHARGE

## 2023-07-19 RX ORDER — GLUCAGON INJECTION, SOLUTION 0.5 MG/.1ML
1 INJECTION, SOLUTION SUBCUTANEOUS ONCE
Refills: 0 | Status: DISCONTINUED | OUTPATIENT
Start: 2023-07-19 | End: 2023-07-21

## 2023-07-19 RX ORDER — HYDROMORPHONE HYDROCHLORIDE 2 MG/ML
0.2 INJECTION INTRAMUSCULAR; INTRAVENOUS; SUBCUTANEOUS
Refills: 0 | Status: DISCONTINUED | OUTPATIENT
Start: 2023-07-19 | End: 2023-07-20

## 2023-07-19 RX ORDER — KETOTIFEN FUMARATE 0.34 MG/ML
1 SOLUTION OPHTHALMIC EVERY 8 HOURS
Refills: 0 | Status: DISCONTINUED | OUTPATIENT
Start: 2023-07-19 | End: 2023-07-19

## 2023-07-19 RX ORDER — ACETAMINOPHEN 500 MG
1000 TABLET ORAL ONCE
Refills: 0 | Status: COMPLETED | OUTPATIENT
Start: 2023-07-19 | End: 2023-07-19

## 2023-07-19 RX ORDER — LEVOTHYROXINE SODIUM 125 MCG
1 TABLET ORAL
Qty: 0 | Refills: 0 | DISCHARGE

## 2023-07-19 RX ORDER — LATANOPROST 0.05 MG/ML
1 SOLUTION/ DROPS OPHTHALMIC; TOPICAL AT BEDTIME
Refills: 0 | Status: DISCONTINUED | OUTPATIENT
Start: 2023-07-19 | End: 2023-07-19

## 2023-07-19 RX ORDER — LATANOPROST 0.05 MG/ML
0 SOLUTION/ DROPS OPHTHALMIC; TOPICAL
Qty: 0 | Refills: 0 | DISCHARGE

## 2023-07-19 RX ORDER — SIMVASTATIN 20 MG/1
1 TABLET, FILM COATED ORAL
Qty: 0 | Refills: 0 | DISCHARGE

## 2023-07-19 RX ORDER — MECLIZINE HCL 12.5 MG
1 TABLET ORAL
Qty: 0 | Refills: 0 | DISCHARGE

## 2023-07-19 RX ORDER — LEVOTHYROXINE SODIUM 125 MCG
25 TABLET ORAL DAILY
Refills: 0 | Status: DISCONTINUED | OUTPATIENT
Start: 2023-07-19 | End: 2023-07-21

## 2023-07-19 RX ORDER — ENOXAPARIN SODIUM 100 MG/ML
40 INJECTION SUBCUTANEOUS ONCE
Refills: 0 | Status: COMPLETED | OUTPATIENT
Start: 2023-07-19 | End: 2023-07-19

## 2023-07-19 RX ORDER — THIAMINE MONONITRATE (VIT B1) 100 MG
500 TABLET ORAL DAILY
Refills: 0 | Status: COMPLETED | OUTPATIENT
Start: 2023-07-19 | End: 2023-07-21

## 2023-07-19 RX ORDER — ACETAMINOPHEN 500 MG
1000 TABLET ORAL EVERY 6 HOURS
Refills: 0 | Status: DISCONTINUED | OUTPATIENT
Start: 2023-07-19 | End: 2023-07-21

## 2023-07-19 RX ORDER — PANTOPRAZOLE SODIUM 20 MG/1
40 TABLET, DELAYED RELEASE ORAL DAILY
Refills: 0 | Status: DISCONTINUED | OUTPATIENT
Start: 2023-07-19 | End: 2023-07-21

## 2023-07-19 RX ORDER — DEXTROSE 50 % IN WATER 50 %
15 SYRINGE (ML) INTRAVENOUS ONCE
Refills: 0 | Status: DISCONTINUED | OUTPATIENT
Start: 2023-07-19 | End: 2023-07-21

## 2023-07-19 RX ORDER — INSULIN LISPRO 100/ML
VIAL (ML) SUBCUTANEOUS
Refills: 0 | Status: DISCONTINUED | OUTPATIENT
Start: 2023-07-19 | End: 2023-07-21

## 2023-07-19 RX ORDER — INSULIN LISPRO 100/ML
VIAL (ML) SUBCUTANEOUS AT BEDTIME
Refills: 0 | Status: DISCONTINUED | OUTPATIENT
Start: 2023-07-19 | End: 2023-07-21

## 2023-07-19 RX ORDER — INSULIN GLARGINE 100 [IU]/ML
50 INJECTION, SOLUTION SUBCUTANEOUS
Qty: 0 | Refills: 0 | DISCHARGE

## 2023-07-19 RX ORDER — ONDANSETRON 8 MG/1
4 TABLET, FILM COATED ORAL EVERY 6 HOURS
Refills: 0 | Status: DISCONTINUED | OUTPATIENT
Start: 2023-07-19 | End: 2023-07-21

## 2023-07-19 RX ORDER — ERGOCALCIFEROL 1.25 MG/1
1 CAPSULE ORAL
Qty: 0 | Refills: 0 | DISCHARGE

## 2023-07-19 RX ORDER — DEXTROSE 50 % IN WATER 50 %
12.5 SYRINGE (ML) INTRAVENOUS ONCE
Refills: 0 | Status: DISCONTINUED | OUTPATIENT
Start: 2023-07-19 | End: 2023-07-21

## 2023-07-19 RX ADMIN — Medication 1000 MILLIGRAM(S): at 09:44

## 2023-07-19 RX ADMIN — SODIUM CHLORIDE 150 MILLILITER(S): 9 INJECTION, SOLUTION INTRAVENOUS at 20:53

## 2023-07-19 RX ADMIN — SCOPALAMINE 1 PATCH: 1 PATCH, EXTENDED RELEASE TRANSDERMAL at 19:39

## 2023-07-19 RX ADMIN — SCOPALAMINE 1 PATCH: 1 PATCH, EXTENDED RELEASE TRANSDERMAL at 09:44

## 2023-07-19 RX ADMIN — PANTOPRAZOLE SODIUM 40 MILLIGRAM(S): 20 TABLET, DELAYED RELEASE ORAL at 17:15

## 2023-07-19 RX ADMIN — ENOXAPARIN SODIUM 40 MILLIGRAM(S): 100 INJECTION SUBCUTANEOUS at 09:44

## 2023-07-19 NOTE — PROGRESS NOTE ADULT - SUBJECTIVE AND OBJECTIVE BOX
POST-OPERATIVE NOTE    Procedure:  Robotic assisted lap sleeve gastrectomy     Diagnosis/Indication: Morbid obesity     Surgeon: Dr. Marti     S: Pt has no complaints. Denies CP, SOB, LUNA, calf tenderness. Pain controlled with medication.    O:  T(C): 37.1 (07-19-23 @ 18:09), Max: 37.1 (07-19-23 @ 18:09)  T(F): 98.7 (07-19-23 @ 18:09), Max: 98.7 (07-19-23 @ 18:09)  HR: 68 (07-19-23 @ 18:09) (51 - 68)  BP: 141/69 (07-19-23 @ 18:09) (105/59 - 141/69)  RR: 18 (07-19-23 @ 18:09) (11 - 24)  SpO2: 99% (07-19-23 @ 18:09) (97% - 100%)  Wt(kg): --                        11.7   6.18  )-----------( 232      ( 19 Jul 2023 14:11 )             33.5     07-19    139  |  104  |  37<H>  ----------------------------<  62<L>  3.8   |  25  |  2.03<H>    Ca    8.7      19 Jul 2023 14:11        Gen: NAD, resting comfortably in bed  C/V: NSR  Pulm: Nonlabored breathing, no respiratory distress  Abd: soft, non distended , TTP around incision site, incision clean dry and intact   Extrem: WWP, no calf edema, SCDs in place

## 2023-07-19 NOTE — H&P ADULT - HISTORY OF PRESENT ILLNESS
62F with PMHx of obesity(BMI 36),CAD (stent x 2 in 2008/2009 on plavix), NELLIE (on CPAP), DM, HLD and HTN and PSHx of endometrial polyp removal (2/23) and tubal ligation who is presenting for sleeve gastrectomy.     pmhx: obesity (BMI 36),CAD (stent x 2 in 2008/2009 on plavix), NELLIE (on CPAP), DM, HLD and HTN   pshx: endometrial polyp removal (2/23), tubal ligation (37 yrs ago)  home meds: simvastatin 20mg daily, levothyroxine 25 mcg daily, omeprazole 40 mg daily, metformin 750 daily, losartan 100mg daily, plavix 75mg daily, Trulicity, insulin 20units at bedtime  allergies: NKDA

## 2023-07-19 NOTE — H&P ADULT - NSICDXPASTMEDICALHX_GEN_ALL_CORE_FT
PAST MEDICAL HISTORY:  2019 novel coronavirus disease (COVID-19) 2 years ago    Asthma last episode about 3 mos ago R/T cold   ( post covid   Dx)    Diabetes     High blood pressure     High cholesterol     Hypothyroid

## 2023-07-19 NOTE — BRIEF OPERATIVE NOTE - OPERATION/FINDINGS
Robot docked, and targeted. Lesser sac entered. Short gastrics divided with Vessel sealer. 36F Bougie edge using robotic stapler.  The stomach staple line oversewn with Quill PDO. The abdomen was inspected. Hemostasis achieved. Robot undocked and the stomach remnant was removed. Fascia was closed with endoclose suture. Skin closed with 4-0 monocryl.

## 2023-07-19 NOTE — PRE-ANESTHESIA EVALUATION ADULT - NSANTHLABRESULTSFT_GEN_ALL_CORE
Please see paper chart  Outside chart/data reviewed prior to procedure/surgery Please see paper chart  Outside chart/data reviewed prior to procedure/surgery  hct 39.6  plt 249  bun 25  cr 0.8  inr 1.02

## 2023-07-19 NOTE — PROGRESS NOTE ADULT - ASSESSMENT
62F with PMHx of obesity(BMI 36),CAD (stent x 2 in 2008/2009 on plavix), NELLIE (on CPAP), DM, HLD and HTN and PSHx of tubal ligation now s/p RA lap sleeve gastrectomy.     CC BCLD/IVF  Pain/nausea control  ISS  CPAP  OOBA/SCD/IS  Lovenox POD 1  Nutrtion Consult  AM labs

## 2023-07-19 NOTE — H&P ADULT - ASSESSMENT
62F with PMHx of obesity(BMI 36),CAD (stent x 2 in 2008/2009 on plavix), NELLIE (on CPAP), DM, HLD and HTN and PSHx of endometrial polyp removal (2/23) and tubal ligation who is presenting for sleeve gastrectomy.     Proceed to OR  Admit to Dr. Marti team 2

## 2023-07-19 NOTE — H&P ADULT - NSICDXPASTSURGICALHX_GEN_ALL_CORE_FT
PAST SURGICAL HISTORY:  H/O knee surgery x2  Scopes only    H/O varicose veins     History of surgery on arm     Stented coronary artery x2    Uterine mass

## 2023-07-20 LAB
A1C WITH ESTIMATED AVERAGE GLUCOSE RESULT: 8.6 % — HIGH (ref 4–5.6)
ANION GAP SERPL CALC-SCNC: 10 MMOL/L — SIGNIFICANT CHANGE UP (ref 5–17)
ANISOCYTOSIS BLD QL: SLIGHT — SIGNIFICANT CHANGE UP
APPEARANCE UR: CLEAR — SIGNIFICANT CHANGE UP
BASOPHILS # BLD AUTO: 0.07 K/UL — SIGNIFICANT CHANGE UP (ref 0–0.2)
BASOPHILS NFR BLD AUTO: 0.9 % — SIGNIFICANT CHANGE UP (ref 0–2)
BILIRUB UR-MCNC: NEGATIVE — SIGNIFICANT CHANGE UP
BUN SERPL-MCNC: 27 MG/DL — HIGH (ref 7–23)
BURR CELLS BLD QL SMEAR: PRESENT — SIGNIFICANT CHANGE UP
CALCIUM SERPL-MCNC: 9 MG/DL — SIGNIFICANT CHANGE UP (ref 8.4–10.5)
CHLORIDE SERPL-SCNC: 105 MMOL/L — SIGNIFICANT CHANGE UP (ref 96–108)
CO2 SERPL-SCNC: 24 MMOL/L — SIGNIFICANT CHANGE UP (ref 22–31)
COLOR SPEC: YELLOW — SIGNIFICANT CHANGE UP
CREAT ?TM UR-MCNC: 30 MG/DL — SIGNIFICANT CHANGE UP
CREAT ?TM UR-MCNC: 30 MG/DL — SIGNIFICANT CHANGE UP
CREAT SERPL-MCNC: 1.62 MG/DL — HIGH (ref 0.5–1.3)
DIFF PNL FLD: NEGATIVE — SIGNIFICANT CHANGE UP
EGFR: 35 ML/MIN/1.73M2 — LOW
EOSINOPHIL # BLD AUTO: 0 K/UL — SIGNIFICANT CHANGE UP (ref 0–0.5)
EOSINOPHIL NFR BLD AUTO: 0 % — SIGNIFICANT CHANGE UP (ref 0–6)
ESTIMATED AVERAGE GLUCOSE: 200 MG/DL — HIGH (ref 68–114)
GLUCOSE BLDC GLUCOMTR-MCNC: 142 MG/DL — HIGH (ref 70–99)
GLUCOSE BLDC GLUCOMTR-MCNC: 146 MG/DL — HIGH (ref 70–99)
GLUCOSE BLDC GLUCOMTR-MCNC: 161 MG/DL — HIGH (ref 70–99)
GLUCOSE BLDC GLUCOMTR-MCNC: 201 MG/DL — HIGH (ref 70–99)
GLUCOSE SERPL-MCNC: 190 MG/DL — HIGH (ref 70–99)
GLUCOSE UR QL: NEGATIVE — SIGNIFICANT CHANGE UP
HCT VFR BLD CALC: 35.5 % — SIGNIFICANT CHANGE UP (ref 34.5–45)
HGB BLD-MCNC: 12 G/DL — SIGNIFICANT CHANGE UP (ref 11.5–15.5)
KETONES UR-MCNC: NEGATIVE — SIGNIFICANT CHANGE UP
LEUKOCYTE ESTERASE UR-ACNC: NEGATIVE — SIGNIFICANT CHANGE UP
LYMPHOCYTES # BLD AUTO: 1.29 K/UL — SIGNIFICANT CHANGE UP (ref 1–3.3)
LYMPHOCYTES # BLD AUTO: 15.8 % — SIGNIFICANT CHANGE UP (ref 13–44)
MACROCYTES BLD QL: SLIGHT — SIGNIFICANT CHANGE UP
MAGNESIUM SERPL-MCNC: 1.9 MG/DL — SIGNIFICANT CHANGE UP (ref 1.6–2.6)
MANUAL SMEAR VERIFICATION: SIGNIFICANT CHANGE UP
MCHC RBC-ENTMCNC: 30.8 PG — SIGNIFICANT CHANGE UP (ref 27–34)
MCHC RBC-ENTMCNC: 33.8 GM/DL — SIGNIFICANT CHANGE UP (ref 32–36)
MCV RBC AUTO: 91.3 FL — SIGNIFICANT CHANGE UP (ref 80–100)
MICROCYTES BLD QL: SLIGHT — SIGNIFICANT CHANGE UP
MONOCYTES # BLD AUTO: 0.42 K/UL — SIGNIFICANT CHANGE UP (ref 0–0.9)
MONOCYTES NFR BLD AUTO: 5.2 % — SIGNIFICANT CHANGE UP (ref 2–14)
NEUTROPHILS # BLD AUTO: 6.31 K/UL — SIGNIFICANT CHANGE UP (ref 1.8–7.4)
NEUTROPHILS NFR BLD AUTO: 77.2 % — HIGH (ref 43–77)
NITRITE UR-MCNC: NEGATIVE — SIGNIFICANT CHANGE UP
OSMOLALITY UR: 189 MOSM/KG — LOW (ref 300–900)
OVALOCYTES BLD QL SMEAR: SLIGHT — SIGNIFICANT CHANGE UP
PH UR: 6 — SIGNIFICANT CHANGE UP (ref 5–8)
PHOSPHATE SERPL-MCNC: 3.8 MG/DL — SIGNIFICANT CHANGE UP (ref 2.5–4.5)
PLAT MORPH BLD: NORMAL — SIGNIFICANT CHANGE UP
PLATELET # BLD AUTO: 231 K/UL — SIGNIFICANT CHANGE UP (ref 150–400)
POIKILOCYTOSIS BLD QL AUTO: SLIGHT — SIGNIFICANT CHANGE UP
POTASSIUM SERPL-MCNC: 5 MMOL/L — SIGNIFICANT CHANGE UP (ref 3.5–5.3)
POTASSIUM SERPL-SCNC: 5 MMOL/L — SIGNIFICANT CHANGE UP (ref 3.5–5.3)
POTASSIUM UR-SCNC: 9 MMOL/L — SIGNIFICANT CHANGE UP
PROT ?TM UR-MCNC: 6 MG/DL — SIGNIFICANT CHANGE UP (ref 0–12)
PROT UR-MCNC: NEGATIVE MG/DL — SIGNIFICANT CHANGE UP
PROT/CREAT UR-RTO: 0.2 RATIO — SIGNIFICANT CHANGE UP (ref 0–0.2)
RBC # BLD: 3.89 M/UL — SIGNIFICANT CHANGE UP (ref 3.8–5.2)
RBC # FLD: 12.6 % — SIGNIFICANT CHANGE UP (ref 10.3–14.5)
RBC BLD AUTO: ABNORMAL
SODIUM SERPL-SCNC: 139 MMOL/L — SIGNIFICANT CHANGE UP (ref 135–145)
SODIUM UR-SCNC: 44 MMOL/L — SIGNIFICANT CHANGE UP
SP GR SPEC: 1.01 — SIGNIFICANT CHANGE UP (ref 1–1.03)
SPHEROCYTES BLD QL SMEAR: SLIGHT — SIGNIFICANT CHANGE UP
UROBILINOGEN FLD QL: 0.2 E.U./DL — SIGNIFICANT CHANGE UP
UUN UR-MCNC: 200 MG/DL — SIGNIFICANT CHANGE UP
VARIANT LYMPHS # BLD: 0.9 % — SIGNIFICANT CHANGE UP (ref 0–6)
WBC # BLD: 8.17 K/UL — SIGNIFICANT CHANGE UP (ref 3.8–10.5)
WBC # FLD AUTO: 8.17 K/UL — SIGNIFICANT CHANGE UP (ref 3.8–10.5)

## 2023-07-20 PROCEDURE — 76770 US EXAM ABDO BACK WALL COMP: CPT | Mod: 26

## 2023-07-20 PROCEDURE — 99223 1ST HOSP IP/OBS HIGH 75: CPT

## 2023-07-20 RX ORDER — OXYCODONE HYDROCHLORIDE 5 MG/1
5 TABLET ORAL EVERY 6 HOURS
Refills: 0 | Status: DISCONTINUED | OUTPATIENT
Start: 2023-07-20 | End: 2023-07-21

## 2023-07-20 RX ORDER — MAGNESIUM SULFATE 500 MG/ML
1 VIAL (ML) INJECTION ONCE
Refills: 0 | Status: COMPLETED | OUTPATIENT
Start: 2023-07-20 | End: 2023-07-20

## 2023-07-20 RX ORDER — HEPARIN SODIUM 5000 [USP'U]/ML
7500 INJECTION INTRAVENOUS; SUBCUTANEOUS EVERY 8 HOURS
Refills: 0 | Status: DISCONTINUED | OUTPATIENT
Start: 2023-07-20 | End: 2023-07-21

## 2023-07-20 RX ORDER — LIDOCAINE 4 G/100G
1 CREAM TOPICAL DAILY
Refills: 0 | Status: DISCONTINUED | OUTPATIENT
Start: 2023-07-20 | End: 2023-07-21

## 2023-07-20 RX ADMIN — Medication 10 MILLIGRAM(S): at 18:38

## 2023-07-20 RX ADMIN — SODIUM CHLORIDE 150 MILLILITER(S): 9 INJECTION, SOLUTION INTRAVENOUS at 10:39

## 2023-07-20 RX ADMIN — PANTOPRAZOLE SODIUM 40 MILLIGRAM(S): 20 TABLET, DELAYED RELEASE ORAL at 11:51

## 2023-07-20 RX ADMIN — Medication 4: at 07:41

## 2023-07-20 RX ADMIN — SCOPALAMINE 1 PATCH: 1 PATCH, EXTENDED RELEASE TRANSDERMAL at 19:50

## 2023-07-20 RX ADMIN — Medication 1000 MILLIGRAM(S): at 12:10

## 2023-07-20 RX ADMIN — Medication 2: at 12:26

## 2023-07-20 RX ADMIN — Medication 1000 MILLIGRAM(S): at 13:10

## 2023-07-20 RX ADMIN — LIDOCAINE 1 PATCH: 4 CREAM TOPICAL at 22:34

## 2023-07-20 RX ADMIN — Medication 25 MICROGRAM(S): at 05:32

## 2023-07-20 RX ADMIN — Medication 105 MILLIGRAM(S): at 11:48

## 2023-07-20 RX ADMIN — Medication 100 GRAM(S): at 09:14

## 2023-07-20 RX ADMIN — HEPARIN SODIUM 7500 UNIT(S): 5000 INJECTION INTRAVENOUS; SUBCUTANEOUS at 14:16

## 2023-07-20 RX ADMIN — SCOPALAMINE 1 PATCH: 1 PATCH, EXTENDED RELEASE TRANSDERMAL at 06:14

## 2023-07-20 RX ADMIN — SODIUM CHLORIDE 150 MILLILITER(S): 9 INJECTION, SOLUTION INTRAVENOUS at 18:38

## 2023-07-20 RX ADMIN — HEPARIN SODIUM 7500 UNIT(S): 5000 INJECTION INTRAVENOUS; SUBCUTANEOUS at 21:35

## 2023-07-20 NOTE — CONSULT NOTE ADULT - SUBJECTIVE AND OBJECTIVE BOX
HPI:  62F with HTN, DM, who presented 7/19 for sleeve gastrectomy (done 7/19). Post-op Cr 2.03, so renal consulted. Pt states a week leading up to the surgery she has had little PO intake, mostly soft to liquid diet. Otherwise, denies any SOB, CP, nausea at this time. Intraop, pt w/ 40 ml of blood loss and 1 L IV fluids infused. Cr now 1.62 afte LR started 7/19        PAST MEDICAL & SURGICAL HISTORY:  High blood pressure      Asthma  last episode about 3 mos ago R/T cold   ( post covid   Dx)      2019 novel coronavirus disease (COVID-19)  2 years ago      Hypothyroid      Diabetes      High cholesterol      Uterine mass      H/O knee surgery  x2  Scopes only      History of surgery on arm      H/O varicose veins      Stented coronary artery  x2            Allergies:  No Known Allergies      Home Medications:   B Complex 50: orally once a day  Basaglar KwikPen 100 units/mL subcutaneous solution: 50  subcutaneous once a day  ketotifen 0.025% ophthalmic solution: 1 drop(s) to each affected eye every 8 hours  latanoprost ophthalmic: to each affected eye once a day  levothyroxine 25 mcg (0.025 mg) oral tablet: 1 tab(s) orally once a day  losartan 100 mg oral tablet: 1 tab(s) orally once a day  metFORMIN 750 mg oral tablet, extended release: 1 tab(s) orally once a day  multivitamin: 1  orally once a day  omeprazole 40 mg oral delayed release capsule: 1 cap(s) orally once a day  Plavix 75 mg oral tablet: 1 tab(s) orally once a day  simvastatin 20 mg oral tablet: 1 tab(s) orally once a day (at bedtime)  Trulicity Pen 0.75 mg/0.5 mL subcutaneous solution:   Vitamin D2: 1  orally once a day      Hospital Medications:   MEDICATIONS  (STANDING):  dextrose 5%. 1000 milliLiter(s) (50 mL/Hr) IV Continuous <Continuous>  dextrose 5%. 1000 milliLiter(s) (100 mL/Hr) IV Continuous <Continuous>  dextrose 50% Injectable 25 Gram(s) IV Push once  dextrose 50% Injectable 25 Gram(s) IV Push once  dextrose 50% Injectable 12.5 Gram(s) IV Push once  glucagon  Injectable 1 milliGRAM(s) IntraMuscular once  heparin   Injectable 7500 Unit(s) SubCutaneous every 8 hours  insulin lispro (ADMELOG) corrective regimen sliding scale   SubCutaneous three times a day before meals  insulin lispro (ADMELOG) corrective regimen sliding scale   SubCutaneous at bedtime  lactated ringers. 1000 milliLiter(s) (150 mL/Hr) IV Continuous <Continuous>  levothyroxine 25 MICROGram(s) Oral daily  pantoprazole  Injectable 40 milliGRAM(s) IV Push daily  thiamine IVPB 500 milliGRAM(s) IV Intermittent daily      SOCIAL HISTORY:  Denies ETOh, Smoking    Family History:  FAMILY HISTORY:        VITALS:  T(F): 98 (07-20-23 @ 14:03), Max: 98.8 (07-20-23 @ 08:46)  HR: 62 (07-20-23 @ 14:03)  BP: 115/55 (07-20-23 @ 14:03)  RR: 17 (07-20-23 @ 14:03)  SpO2: 95% (07-20-23 @ 14:03)  Wt(kg): --    07-19 @ 07:01  -  07-20 @ 07:00  --------------------------------------------------------  IN: 1800 mL / OUT: 2300 mL / NET: -500 mL    07-20 @ 07:01  -  07-20 @ 16:13  --------------------------------------------------------  IN: 1460 mL / OUT: 700 mL / NET: 760 mL        CAPILLARY BLOOD GLUCOSE      POCT Blood Glucose.: 161 mg/dL (20 Jul 2023 12:18)  POCT Blood Glucose.: 201 mg/dL (20 Jul 2023 07:37)  POCT Blood Glucose.: 153 mg/dL (19 Jul 2023 22:17)  POCT Blood Glucose.: 129 mg/dL (19 Jul 2023 17:02)      Review of Systems:  Negative except as mentioned in HPI    PHYSICAL EXAM:  GENERAL: Alert, awake, NAD  CHEST/LUNG: No accessory muscle use  HEART: S1, S2  ABDOMEN: Soft, non distended  EXTREMITIES: no edema  Neurology: AAOx3      LABS:  07-20    139  |  105  |  27<H>  ----------------------------<  190<H>  5.0   |  24  |  1.62<H>    Ca    9.0      20 Jul 2023 05:30  Phos  3.8     07-20  Mg     1.9     07-20      Creatinine Trend: 1.62 <--, 1.92 <--, 2.03 <--                        12.0   8.17  )-----------( 231      ( 20 Jul 2023 05:30 )             35.5     Urine Studies:  Urinalysis Basic - ( 20 Jul 2023 05:30 )    Color:  / Appearance:  / SG:  / pH:   Gluc: 190 mg/dL / Ketone:   / Bili:  / Urobili:    Blood:  / Protein:  / Nitrite:    Leuk Esterase:  / RBC:  / WBC    Sq Epi:  / Non Sq Epi:  / Bacteria:       Sodium, Random Urine: 44 mmol/L (07-19 @ 18:06)  Creatinine, Random Urine: 30 mg/dL (07-19 @ 18:06)  Creatinine, Random Urine: 30 mg/dL (07-19 @ 18:06)  Protein/Creatinine Ratio Calculation: 0.2 Ratio (07-19 @ 18:06)  Osmolality, Random Urine: 189 mosm/kg (07-19 @ 18:06)  Potassium, Random Urine: 9 mmol/L (07-19 @ 18:06)               HPI:  62F with HTN, DM, who presented 7/19 for sleeve gastrectomy (done 7/19). Post-op Cr 2.03, so renal consulted. Pt states a week leading up to the surgery she has had little PO intake, mostly soft to liquid diet. Otherwise, denies any SOB, CP, nausea at this time. Intraop, pt w/ 40 ml of blood loss and 1 L IV fluids infused. Cr now 1.62 after LR started 7/19        PAST MEDICAL & SURGICAL HISTORY:  High blood pressure      Asthma  last episode about 3 mos ago R/T cold   ( post covid   Dx)      2019 novel coronavirus disease (COVID-19)  2 years ago      Hypothyroid      Diabetes      High cholesterol      Uterine mass      H/O knee surgery  x2  Scopes only      History of surgery on arm      H/O varicose veins      Stented coronary artery  x2            Allergies:  No Known Allergies      Home Medications:   B Complex 50: orally once a day  Basaglar KwikPen 100 units/mL subcutaneous solution: 50  subcutaneous once a day  ketotifen 0.025% ophthalmic solution: 1 drop(s) to each affected eye every 8 hours  latanoprost ophthalmic: to each affected eye once a day  levothyroxine 25 mcg (0.025 mg) oral tablet: 1 tab(s) orally once a day  losartan 100 mg oral tablet: 1 tab(s) orally once a day  metFORMIN 750 mg oral tablet, extended release: 1 tab(s) orally once a day  multivitamin: 1  orally once a day  omeprazole 40 mg oral delayed release capsule: 1 cap(s) orally once a day  Plavix 75 mg oral tablet: 1 tab(s) orally once a day  simvastatin 20 mg oral tablet: 1 tab(s) orally once a day (at bedtime)  Trulicity Pen 0.75 mg/0.5 mL subcutaneous solution:   Vitamin D2: 1  orally once a day      Hospital Medications:   MEDICATIONS  (STANDING):  dextrose 5%. 1000 milliLiter(s) (50 mL/Hr) IV Continuous <Continuous>  dextrose 5%. 1000 milliLiter(s) (100 mL/Hr) IV Continuous <Continuous>  dextrose 50% Injectable 25 Gram(s) IV Push once  dextrose 50% Injectable 25 Gram(s) IV Push once  dextrose 50% Injectable 12.5 Gram(s) IV Push once  glucagon  Injectable 1 milliGRAM(s) IntraMuscular once  heparin   Injectable 7500 Unit(s) SubCutaneous every 8 hours  insulin lispro (ADMELOG) corrective regimen sliding scale   SubCutaneous three times a day before meals  insulin lispro (ADMELOG) corrective regimen sliding scale   SubCutaneous at bedtime  lactated ringers. 1000 milliLiter(s) (150 mL/Hr) IV Continuous <Continuous>  levothyroxine 25 MICROGram(s) Oral daily  pantoprazole  Injectable 40 milliGRAM(s) IV Push daily  thiamine IVPB 500 milliGRAM(s) IV Intermittent daily      SOCIAL HISTORY:  Denies ETOh, Smoking    Family History:  FAMILY HISTORY:        VITALS:  T(F): 98 (07-20-23 @ 14:03), Max: 98.8 (07-20-23 @ 08:46)  HR: 62 (07-20-23 @ 14:03)  BP: 115/55 (07-20-23 @ 14:03)  RR: 17 (07-20-23 @ 14:03)  SpO2: 95% (07-20-23 @ 14:03)  Wt(kg): --    07-19 @ 07:01  -  07-20 @ 07:00  --------------------------------------------------------  IN: 1800 mL / OUT: 2300 mL / NET: -500 mL    07-20 @ 07:01  -  07-20 @ 16:13  --------------------------------------------------------  IN: 1460 mL / OUT: 700 mL / NET: 760 mL        CAPILLARY BLOOD GLUCOSE      POCT Blood Glucose.: 161 mg/dL (20 Jul 2023 12:18)  POCT Blood Glucose.: 201 mg/dL (20 Jul 2023 07:37)  POCT Blood Glucose.: 153 mg/dL (19 Jul 2023 22:17)  POCT Blood Glucose.: 129 mg/dL (19 Jul 2023 17:02)      Review of Systems:  Negative except as mentioned in HPI    PHYSICAL EXAM:  GENERAL: Alert, awake, NAD  CHEST/LUNG: No accessory muscle use  HEART: S1, S2  ABDOMEN: Soft, non distended  EXTREMITIES: no edema  Neurology: AAOx3      LABS:  07-20    139  |  105  |  27<H>  ----------------------------<  190<H>  5.0   |  24  |  1.62<H>    Ca    9.0      20 Jul 2023 05:30  Phos  3.8     07-20  Mg     1.9     07-20      Creatinine Trend: 1.62 <--, 1.92 <--, 2.03 <--                        12.0   8.17  )-----------( 231      ( 20 Jul 2023 05:30 )             35.5     Urine Studies:  Urinalysis Basic - ( 20 Jul 2023 05:30 )    Color:  / Appearance:  / SG:  / pH:   Gluc: 190 mg/dL / Ketone:   / Bili:  / Urobili:    Blood:  / Protein:  / Nitrite:    Leuk Esterase:  / RBC:  / WBC    Sq Epi:  / Non Sq Epi:  / Bacteria:       Sodium, Random Urine: 44 mmol/L (07-19 @ 18:06)  Creatinine, Random Urine: 30 mg/dL (07-19 @ 18:06)  Creatinine, Random Urine: 30 mg/dL (07-19 @ 18:06)  Protein/Creatinine Ratio Calculation: 0.2 Ratio (07-19 @ 18:06)  Osmolality, Random Urine: 189 mosm/kg (07-19 @ 18:06)  Potassium, Random Urine: 9 mmol/L (07-19 @ 18:06)

## 2023-07-20 NOTE — DIETITIAN INITIAL EVALUATION ADULT - LITERATURE/VIDEOS GIVEN
RD Discussed volumes of various cup sizes on tray table and encouraged aiming for 4 oz/hr as tolerated. Pt is prepared with protein shakes, with plan to get vitamins after discharge. RD provided indepth edu on diet advancement and specific nutrient needs s/p BLANK. Pt appears receptive, verbalized understanding. Written nutrition education handouts provided.

## 2023-07-20 NOTE — DIETITIAN INITIAL EVALUATION ADULT - NUTRITIONGOAL OUTCOME1
- Pt to show adherence to diet regimen   - Pt to recall at least 2 main concepts from education (protein needs, fluid needs, vitamin and mineral needs)

## 2023-07-20 NOTE — DIETITIAN INITIAL EVALUATION ADULT - ADD RECOMMEND
1. Continue with current diet order (CL bariatric)  > diet progression per protocol/tolerance   2. Monitor PO intake/tolerance   3. RD to reinforce edu prn   4. Monitor GI fxn, skin integrity, labs, wts   > continue insulin regimen per team to reach euglycemia; FSBG q4-6hrs   > consider bowel regimen to promote optimal stooling   5. RD to remain available

## 2023-07-20 NOTE — PROGRESS NOTE ADULT - ASSESSMENT
62F with PMHx of obesity(BMI 36),CAD (stent x 2 in 2008/2009 on plavix), NELLIE (on CPAP), DM, HLD and HTN and PSHx of tubal ligation now s/p RA lap sleeve gastrectomy. Creatinine post-op 2.03, f/u labs show downtrend 1.62; possible etiologies include inadequate fluid resuscitation vs. pre-op abx drug rxn. Renal team consulted; recs appreciated.      f/u renal team recs   f/u FS glucose   CC BCLD/IVF  Pain/nausea control  ISS  CPAP  OOBA/SCD/IS  SQH  Nutrtion Consult  AM labs

## 2023-07-20 NOTE — CONSULT NOTE ADULT - ATTENDING COMMENTS
per-op CARMEN likely due to hypovolemia -- agre with holding CYNDEE meds and cont IVF   fxn improving   may be able to dc home off BP meds -- or at least off hyzaar depending on BP in am

## 2023-07-20 NOTE — DIETITIAN INITIAL EVALUATION ADULT - OTHER INFO
62F with PMHx of obesity(BMI 36),CAD (stent x 2 in 2008/2009 on plavix), NELLIE (on CPAP), DM, HLD and HTN and PSHx of tubal ligation now s/p RA lap sleeve gastrectomy.    Visited pt at bedside this am on 9WO. Interview via . On assessment pt is awake but appears lethargic. Reports following diet protocol pre-sleeve gastrectomy pta. Now on clear liquid diet for bariatric. Provided extensive education on diet progression (see below)- pt is receptive. Left education handout at bedside. Nutrition related labs reviewed; HgA1C 8.6; FSBG 201, 153, 129- insulin regimen ordered. No edema noted. With surgical incision to abdomen; no PUs noted; abby scale 20. Endorses constipation; LBM 7/18. Denies pain/discomfort on assessment. Please view full recs below. RD to remain available

## 2023-07-20 NOTE — DIETITIAN INITIAL EVALUATION ADULT - PERTINENT LABORATORY DATA
07-20    139  |  105  |  27<H>  ----------------------------<  190<H>  5.0   |  24  |  1.62<H>    Ca    9.0      20 Jul 2023 05:30  Phos  3.8     07-20  Mg     1.9     07-20    POCT Blood Glucose.: 201 mg/dL (07-20-23 @ 07:37)  A1C with Estimated Average Glucose Result: 8.6 % (07-20-23 @ 05:30)

## 2023-07-20 NOTE — DIETITIAN INITIAL EVALUATION ADULT - OTHER CALCULATIONS
Ideal body weight used to calculate energy needs due to pt's current body weight >120% ideal body weight. Adjusted for status post sleeve gastrectomy

## 2023-07-20 NOTE — CONSULT NOTE ADULT - ASSESSMENT
Assessment/Plan:   61 yo F w/o any hx of kidney disease, w/ HTN, DM admitted 7/19 for sleeve gastrectomy. Nephrology consulted for post-op CARMEN, Cr 2.03    #Non-oliguric CARMEN  BCr 0.8 7/5  Cr 2.03-->1.62 after IV fluids  UA 7/19 bland. UPCR 0.2  Abi 44 7/19  Etiology likely pre-renal disease      Recommend:   c/w IV hydration at 150 ml/hr  renal sono   strict I/Os   Hold antihypertensives. Maintain BP >110-120 for adequate renal perfusison        Shorty Amin M.D.  PGY 5 - Nephrology Fellow  [phone]

## 2023-07-20 NOTE — DIETITIAN INITIAL EVALUATION ADULT - PERTINENT MEDS FT
MEDICATIONS  (STANDING):  dextrose 5%. 1000 milliLiter(s) (50 mL/Hr) IV Continuous <Continuous>  dextrose 5%. 1000 milliLiter(s) (100 mL/Hr) IV Continuous <Continuous>  dextrose 50% Injectable 25 Gram(s) IV Push once  dextrose 50% Injectable 12.5 Gram(s) IV Push once  dextrose 50% Injectable 25 Gram(s) IV Push once  glucagon  Injectable 1 milliGRAM(s) IntraMuscular once  heparin   Injectable 7500 Unit(s) SubCutaneous every 8 hours  HYDROmorphone  Injectable 0.2 milliGRAM(s) IV Push every 15 minutes  insulin lispro (ADMELOG) corrective regimen sliding scale   SubCutaneous three times a day before meals  insulin lispro (ADMELOG) corrective regimen sliding scale   SubCutaneous at bedtime  lactated ringers. 1000 milliLiter(s) (150 mL/Hr) IV Continuous <Continuous>  levothyroxine 25 MICROGram(s) Oral daily  pantoprazole  Injectable 40 milliGRAM(s) IV Push daily  thiamine IVPB 500 milliGRAM(s) IV Intermittent daily    MEDICATIONS  (PRN):  acetaminophen   Oral Liquid .. 1000 milliGRAM(s) Oral every 6 hours PRN Mild Pain (1 - 3), Moderate Pain (4 - 6)  dextrose Oral Gel 15 Gram(s) Oral once PRN Blood Glucose LESS THAN 70 milliGRAM(s)/deciliter  ondansetron Injectable 4 milliGRAM(s) IV Push every 6 hours PRN Nausea and/or Vomiting

## 2023-07-20 NOTE — PROGRESS NOTE ADULT - SUBJECTIVE AND OBJECTIVE BOX
SUBJECTIVE:  Pt found lying comfortably in bed this AM. Pt tolerating some PO intake; denies nausea/vomiting. Denies CP, SOB.     MEDICATIONS  (STANDING):  dextrose 5%. 1000 milliLiter(s) (50 mL/Hr) IV Continuous <Continuous>  dextrose 5%. 1000 milliLiter(s) (100 mL/Hr) IV Continuous <Continuous>  dextrose 50% Injectable 25 Gram(s) IV Push once  dextrose 50% Injectable 25 Gram(s) IV Push once  dextrose 50% Injectable 12.5 Gram(s) IV Push once  glucagon  Injectable 1 milliGRAM(s) IntraMuscular once  heparin   Injectable 7500 Unit(s) SubCutaneous every 8 hours  HYDROmorphone  Injectable 0.2 milliGRAM(s) IV Push every 15 minutes  insulin lispro (ADMELOG) corrective regimen sliding scale   SubCutaneous three times a day before meals  insulin lispro (ADMELOG) corrective regimen sliding scale   SubCutaneous at bedtime  lactated ringers. 1000 milliLiter(s) (150 mL/Hr) IV Continuous <Continuous>  levothyroxine 25 MICROGram(s) Oral daily  magnesium sulfate  IVPB 1 Gram(s) IV Intermittent once  pantoprazole  Injectable 40 milliGRAM(s) IV Push daily  thiamine IVPB 500 milliGRAM(s) IV Intermittent daily    MEDICATIONS  (PRN):  acetaminophen   Oral Liquid .. 1000 milliGRAM(s) Oral every 6 hours PRN Mild Pain (1 - 3), Moderate Pain (4 - 6)  dextrose Oral Gel 15 Gram(s) Oral once PRN Blood Glucose LESS THAN 70 milliGRAM(s)/deciliter  ondansetron Injectable 4 milliGRAM(s) IV Push every 6 hours PRN Nausea and/or Vomiting      Vital Signs Last 24 Hrs  T(C): 36.9 (20 Jul 2023 04:38), Max: 37.1 (19 Jul 2023 18:09)  T(F): 98.5 (20 Jul 2023 04:38), Max: 98.7 (19 Jul 2023 18:09)  HR: 73 (20 Jul 2023 04:50) (50 - 75)  BP: 135/78 (20 Jul 2023 04:38) (93/55 - 141/69)  BP(mean): 83 (19 Jul 2023 17:15) (71 - 83)  RR: 18 (20 Jul 2023 04:50) (11 - 24)  SpO2: 98% (20 Jul 2023 04:50) (95% - 100%)    Parameters below as of 20 Jul 2023 04:50  Patient On (Oxygen Delivery Method): nasal cannula  O2 Flow (L/min): 2      Physical Exam:  General: NAD, resting comfortably in bed  Pulmonary: Nonlabored breathing, no respiratory distress  Cardiovascular: NSR  Abdominal: soft, appropriate malika-incisional tenderness, ND, incisions clean, dry and intact   Extremities: WWP, normal strength  Neuro: A/O x 3, CNs II-XII grossly intact, no focal deficits    I&O's Summary    19 Jul 2023 07:01  -  20 Jul 2023 07:00  --------------------------------------------------------  IN: 1800 mL / OUT: 2300 mL / NET: -500 mL        LABS:                        12.0   8.17  )-----------( 231      ( 20 Jul 2023 05:30 )             35.5     07-20    139  |  105  |  27<H>  ----------------------------<  190<H>  5.0   |  24  |  1.62<H>    Ca    9.0      20 Jul 2023 05:30  Phos  3.8     07-20  Mg     1.9     07-20        Urinalysis Basic - ( 20 Jul 2023 05:30 )    Color: x / Appearance: x / SG: x / pH: x  Gluc: 190 mg/dL / Ketone: x  / Bili: x / Urobili: x   Blood: x / Protein: x / Nitrite: x   Leuk Esterase: x / RBC: x / WBC x   Sq Epi: x / Non Sq Epi: x / Bacteria: x      CAPILLARY BLOOD GLUCOSE      POCT Blood Glucose.: 201 mg/dL (20 Jul 2023 07:37)  POCT Blood Glucose.: 153 mg/dL (19 Jul 2023 22:17)  POCT Blood Glucose.: 129 mg/dL (19 Jul 2023 17:02)  POCT Blood Glucose.: 109 mg/dL (19 Jul 2023 14:47)  POCT Blood Glucose.: 68 mg/dL (19 Jul 2023 14:19)  POCT Blood Glucose.: 70 mg/dL (19 Jul 2023 14:13)  POCT Blood Glucose.: 240 mg/dL (19 Jul 2023 09:51)        RADIOLOGY & ADDITIONAL STUDIES:

## 2023-07-21 ENCOUNTER — TRANSCRIPTION ENCOUNTER (OUTPATIENT)
Age: 63
End: 2023-07-21

## 2023-07-21 VITALS
HEART RATE: 69 BPM | SYSTOLIC BLOOD PRESSURE: 148 MMHG | RESPIRATION RATE: 18 BRPM | OXYGEN SATURATION: 97 % | DIASTOLIC BLOOD PRESSURE: 74 MMHG | TEMPERATURE: 98 F

## 2023-07-21 LAB
ANION GAP SERPL CALC-SCNC: 11 MMOL/L — SIGNIFICANT CHANGE UP (ref 5–17)
BUN SERPL-MCNC: 16 MG/DL — SIGNIFICANT CHANGE UP (ref 7–23)
CALCIUM SERPL-MCNC: 8.8 MG/DL — SIGNIFICANT CHANGE UP (ref 8.4–10.5)
CHLORIDE SERPL-SCNC: 107 MMOL/L — SIGNIFICANT CHANGE UP (ref 96–108)
CO2 SERPL-SCNC: 25 MMOL/L — SIGNIFICANT CHANGE UP (ref 22–31)
CREAT SERPL-MCNC: 1.04 MG/DL — SIGNIFICANT CHANGE UP (ref 0.5–1.3)
EGFR: 60 ML/MIN/1.73M2 — SIGNIFICANT CHANGE UP
GLUCOSE BLDC GLUCOMTR-MCNC: 157 MG/DL — HIGH (ref 70–99)
GLUCOSE BLDC GLUCOMTR-MCNC: 158 MG/DL — HIGH (ref 70–99)
GLUCOSE SERPL-MCNC: 159 MG/DL — HIGH (ref 70–99)
HCT VFR BLD CALC: 35.2 % — SIGNIFICANT CHANGE UP (ref 34.5–45)
HGB BLD-MCNC: 11.8 G/DL — SIGNIFICANT CHANGE UP (ref 11.5–15.5)
MAGNESIUM SERPL-MCNC: 1.8 MG/DL — SIGNIFICANT CHANGE UP (ref 1.6–2.6)
MCHC RBC-ENTMCNC: 31.4 PG — SIGNIFICANT CHANGE UP (ref 27–34)
MCHC RBC-ENTMCNC: 33.5 GM/DL — SIGNIFICANT CHANGE UP (ref 32–36)
MCV RBC AUTO: 93.6 FL — SIGNIFICANT CHANGE UP (ref 80–100)
NRBC # BLD: 0 /100 WBCS — SIGNIFICANT CHANGE UP (ref 0–0)
PHOSPHATE SERPL-MCNC: 2.9 MG/DL — SIGNIFICANT CHANGE UP (ref 2.5–4.5)
PLATELET # BLD AUTO: 232 K/UL — SIGNIFICANT CHANGE UP (ref 150–400)
POTASSIUM SERPL-MCNC: 4.1 MMOL/L — SIGNIFICANT CHANGE UP (ref 3.5–5.3)
POTASSIUM SERPL-SCNC: 4.1 MMOL/L — SIGNIFICANT CHANGE UP (ref 3.5–5.3)
RBC # BLD: 3.76 M/UL — LOW (ref 3.8–5.2)
RBC # FLD: 12.5 % — SIGNIFICANT CHANGE UP (ref 10.3–14.5)
SODIUM SERPL-SCNC: 143 MMOL/L — SIGNIFICANT CHANGE UP (ref 135–145)
WBC # BLD: 6.05 K/UL — SIGNIFICANT CHANGE UP (ref 3.8–10.5)
WBC # FLD AUTO: 6.05 K/UL — SIGNIFICANT CHANGE UP (ref 3.8–10.5)

## 2023-07-21 PROCEDURE — 83735 ASSAY OF MAGNESIUM: CPT

## 2023-07-21 PROCEDURE — 85025 COMPLETE CBC W/AUTO DIFF WBC: CPT

## 2023-07-21 PROCEDURE — S2900: CPT

## 2023-07-21 PROCEDURE — 85027 COMPLETE CBC AUTOMATED: CPT

## 2023-07-21 PROCEDURE — 84156 ASSAY OF PROTEIN URINE: CPT

## 2023-07-21 PROCEDURE — 85018 HEMOGLOBIN: CPT

## 2023-07-21 PROCEDURE — 84300 ASSAY OF URINE SODIUM: CPT

## 2023-07-21 PROCEDURE — 82947 ASSAY GLUCOSE BLOOD QUANT: CPT

## 2023-07-21 PROCEDURE — 83036 HEMOGLOBIN GLYCOSYLATED A1C: CPT

## 2023-07-21 PROCEDURE — 80048 BASIC METABOLIC PNL TOTAL CA: CPT

## 2023-07-21 PROCEDURE — 81003 URINALYSIS AUTO W/O SCOPE: CPT

## 2023-07-21 PROCEDURE — 76770 US EXAM ABDO BACK WALL COMP: CPT

## 2023-07-21 PROCEDURE — 86901 BLOOD TYPING SEROLOGIC RH(D): CPT

## 2023-07-21 PROCEDURE — 86900 BLOOD TYPING SEROLOGIC ABO: CPT

## 2023-07-21 PROCEDURE — 83935 ASSAY OF URINE OSMOLALITY: CPT

## 2023-07-21 PROCEDURE — 82962 GLUCOSE BLOOD TEST: CPT

## 2023-07-21 PROCEDURE — 99232 SBSQ HOSP IP/OBS MODERATE 35: CPT

## 2023-07-21 PROCEDURE — 36415 COLL VENOUS BLD VENIPUNCTURE: CPT

## 2023-07-21 PROCEDURE — 84295 ASSAY OF SERUM SODIUM: CPT

## 2023-07-21 PROCEDURE — 84540 ASSAY OF URINE/UREA-N: CPT

## 2023-07-21 PROCEDURE — 82330 ASSAY OF CALCIUM: CPT

## 2023-07-21 PROCEDURE — C1889: CPT

## 2023-07-21 PROCEDURE — 86850 RBC ANTIBODY SCREEN: CPT

## 2023-07-21 PROCEDURE — 84133 ASSAY OF URINE POTASSIUM: CPT

## 2023-07-21 PROCEDURE — 83605 ASSAY OF LACTIC ACID: CPT

## 2023-07-21 PROCEDURE — 88307 TISSUE EXAM BY PATHOLOGIST: CPT

## 2023-07-21 PROCEDURE — 84100 ASSAY OF PHOSPHORUS: CPT

## 2023-07-21 PROCEDURE — 82570 ASSAY OF URINE CREATININE: CPT

## 2023-07-21 PROCEDURE — 84132 ASSAY OF SERUM POTASSIUM: CPT

## 2023-07-21 RX ORDER — ACETAMINOPHEN 500 MG
20 TABLET ORAL
Qty: 400 | Refills: 0
Start: 2023-07-21 | End: 2023-07-25

## 2023-07-21 RX ORDER — OXYCODONE HYDROCHLORIDE 5 MG/1
5 TABLET ORAL
Qty: 100 | Refills: 0
Start: 2023-07-21 | End: 2023-07-25

## 2023-07-21 RX ORDER — DULAGLUTIDE 4.5 MG/.5ML
0 INJECTION, SOLUTION SUBCUTANEOUS
Qty: 0 | Refills: 0 | DISCHARGE

## 2023-07-21 RX ORDER — OMEPRAZOLE 10 MG/1
1 CAPSULE, DELAYED RELEASE ORAL
Qty: 0 | Refills: 0 | DISCHARGE

## 2023-07-21 RX ORDER — CLOPIDOGREL BISULFATE 75 MG/1
75 TABLET, FILM COATED ORAL DAILY
Refills: 0 | Status: DISCONTINUED | OUTPATIENT
Start: 2023-07-21 | End: 2023-07-21

## 2023-07-21 RX ORDER — METFORMIN HYDROCHLORIDE 850 MG/1
1 TABLET ORAL
Qty: 0 | Refills: 0 | DISCHARGE

## 2023-07-21 RX ORDER — LOSARTAN POTASSIUM 100 MG/1
1 TABLET, FILM COATED ORAL
Qty: 0 | Refills: 0 | DISCHARGE

## 2023-07-21 RX ORDER — MAGNESIUM SULFATE 500 MG/ML
1 VIAL (ML) INJECTION ONCE
Refills: 0 | Status: COMPLETED | OUTPATIENT
Start: 2023-07-21 | End: 2023-07-21

## 2023-07-21 RX ORDER — PANTOPRAZOLE SODIUM 20 MG/1
1 TABLET, DELAYED RELEASE ORAL
Qty: 30 | Refills: 0
Start: 2023-07-21 | End: 2023-08-19

## 2023-07-21 RX ORDER — OMEPRAZOLE 10 MG/1
1 CAPSULE, DELAYED RELEASE ORAL
Qty: 30 | Refills: 0
Start: 2023-07-21 | End: 2023-08-19

## 2023-07-21 RX ORDER — CLOPIDOGREL BISULFATE 75 MG/1
1 TABLET, FILM COATED ORAL
Qty: 0 | Refills: 0 | DISCHARGE
Start: 2023-07-21

## 2023-07-21 RX ORDER — SODIUM,POTASSIUM PHOSPHATES 278-250MG
1 POWDER IN PACKET (EA) ORAL ONCE
Refills: 0 | Status: COMPLETED | OUTPATIENT
Start: 2023-07-21 | End: 2023-07-21

## 2023-07-21 RX ORDER — CLOPIDOGREL BISULFATE 75 MG/1
1 TABLET, FILM COATED ORAL
Qty: 0 | Refills: 0 | DISCHARGE

## 2023-07-21 RX ADMIN — LIDOCAINE 1 PATCH: 4 CREAM TOPICAL at 10:48

## 2023-07-21 RX ADMIN — SCOPALAMINE 1 PATCH: 1 PATCH, EXTENDED RELEASE TRANSDERMAL at 06:01

## 2023-07-21 RX ADMIN — Medication 105 MILLIGRAM(S): at 13:41

## 2023-07-21 RX ADMIN — HEPARIN SODIUM 7500 UNIT(S): 5000 INJECTION INTRAVENOUS; SUBCUTANEOUS at 05:49

## 2023-07-21 RX ADMIN — LIDOCAINE 1 PATCH: 4 CREAM TOPICAL at 06:01

## 2023-07-21 RX ADMIN — Medication 25 MICROGRAM(S): at 05:50

## 2023-07-21 RX ADMIN — HEPARIN SODIUM 7500 UNIT(S): 5000 INJECTION INTRAVENOUS; SUBCUTANEOUS at 13:28

## 2023-07-21 RX ADMIN — Medication 100 GRAM(S): at 10:49

## 2023-07-21 RX ADMIN — Medication 1 PACKET(S): at 10:49

## 2023-07-21 RX ADMIN — PANTOPRAZOLE SODIUM 40 MILLIGRAM(S): 20 TABLET, DELAYED RELEASE ORAL at 12:01

## 2023-07-21 RX ADMIN — Medication 2: at 06:44

## 2023-07-21 RX ADMIN — Medication 2: at 12:43

## 2023-07-21 RX ADMIN — CLOPIDOGREL BISULFATE 75 MILLIGRAM(S): 75 TABLET, FILM COATED ORAL at 10:49

## 2023-07-21 RX ADMIN — LIDOCAINE 1 PATCH: 4 CREAM TOPICAL at 10:06

## 2023-07-21 NOTE — DISCHARGE NOTE PROVIDER - NSDCCPCAREPLAN_GEN_ALL_CORE_FT
The patient is a 40y Male complaining of low back pain. PRINCIPAL DISCHARGE DIAGNOSIS  Diagnosis: Morbid obesity  Assessment and Plan of Treatment: 1) Please take Tylenol 650 mg every 4 to 6 hours by mouth for moderate pain control. Please do not exceed over 4,000 mg of Tylenol a day.  2) Please take oxycodone 5ml of 5mg/5ml oral solution every 6 hours for severe pain. Please do not exceed 20mg or 20ml of oxycodone 5mg/5ml solution in a 24 hour period.   3) Please take Protonix 40 mg once a day by mouth.  4) Please take your blood glucose level BEFORE taking insulin and take the appropriate amount of insulin for your blood sugar. Please follow up with your primary care provider in 1-week to manage your insulin regimen.   Please CRUSH ALL PILLS      SECONDARY DISCHARGE DIAGNOSES  Diagnosis: CAD (coronary artery disease)  Assessment and Plan of Treatment:     Diagnosis: Obstructive sleep apnea  Assessment and Plan of Treatment:     Diagnosis: Diabetes mellitus  Assessment and Plan of Treatment:     Diagnosis: Hyperlipidemia  Assessment and Plan of Treatment:     Diagnosis: Hypertension  Assessment and Plan of Treatment:

## 2023-07-21 NOTE — PROGRESS NOTE ADULT - SUBJECTIVE AND OBJECTIVE BOX
SUBJECTIVE:      MEDICATIONS  (STANDING):  dextrose 5%. 1000 milliLiter(s) (50 mL/Hr) IV Continuous <Continuous>  dextrose 5%. 1000 milliLiter(s) (100 mL/Hr) IV Continuous <Continuous>  dextrose 50% Injectable 25 Gram(s) IV Push once  dextrose 50% Injectable 25 Gram(s) IV Push once  dextrose 50% Injectable 12.5 Gram(s) IV Push once  glucagon  Injectable 1 milliGRAM(s) IntraMuscular once  heparin   Injectable 7500 Unit(s) SubCutaneous every 8 hours  insulin lispro (ADMELOG) corrective regimen sliding scale   SubCutaneous at bedtime  insulin lispro (ADMELOG) corrective regimen sliding scale   SubCutaneous three times a day before meals  lactated ringers. 1000 milliLiter(s) (150 mL/Hr) IV Continuous <Continuous>  levothyroxine 25 MICROGram(s) Oral daily  lidocaine   4% Patch 1 Patch Transdermal daily  pantoprazole  Injectable 40 milliGRAM(s) IV Push daily  thiamine IVPB 500 milliGRAM(s) IV Intermittent daily    MEDICATIONS  (PRN):  acetaminophen   Oral Liquid .. 1000 milliGRAM(s) Oral every 6 hours PRN Mild Pain (1 - 3), Moderate Pain (4 - 6)  dextrose Oral Gel 15 Gram(s) Oral once PRN Blood Glucose LESS THAN 70 milliGRAM(s)/deciliter  ondansetron Injectable 4 milliGRAM(s) IV Push every 6 hours PRN Nausea and/or Vomiting  oxyCODONE    Solution 5 milliGRAM(s) Oral every 6 hours PRN Severe Pain (7 - 10)      Vital Signs Last 24 Hrs  T(C): 36.7 (21 Jul 2023 04:47), Max: 37.1 (20 Jul 2023 08:46)  T(F): 98.1 (21 Jul 2023 04:47), Max: 98.8 (20 Jul 2023 08:46)  HR: 64 (21 Jul 2023 05:51) (60 - 69)  BP: 129/75 (21 Jul 2023 04:47) (98/54 - 143/64)  BP(mean): --  RR: 19 (21 Jul 2023 05:51) (16 - 19)  SpO2: 98% (21 Jul 2023 05:51) (93% - 99%)    Parameters below as of 21 Jul 2023 05:51  Patient On (Oxygen Delivery Method): nasal cannula  O2 Flow (L/min): 2      Physical Exam:  General: NAD, resting comfortably in bed  Pulmonary: Nonlabored breathing, no respiratory distress  Cardiovascular: NSR  Abdominal: soft, NT/ND  Extremities: WWP, normal strength  Neuro: A/O x 3, CNs II-XII grossly intact, no focal deficits    I&O's Summary    20 Jul 2023 07:01  -  21 Jul 2023 07:00  --------------------------------------------------------  IN: 3920 mL / OUT: 2200 mL / NET: 1720 mL        LABS:                        12.0   8.17  )-----------( 231      ( 20 Jul 2023 05:30 )             35.5     07-20    139  |  105  |  27<H>  ----------------------------<  190<H>  5.0   |  24  |  1.62<H>    Ca    9.0      20 Jul 2023 05:30  Phos  3.8     07-20  Mg     1.9     07-20        Urinalysis Basic - ( 20 Jul 2023 05:30 )    Color: x / Appearance: x / SG: x / pH: x  Gluc: 190 mg/dL / Ketone: x  / Bili: x / Urobili: x   Blood: x / Protein: x / Nitrite: x   Leuk Esterase: x / RBC: x / WBC x   Sq Epi: x / Non Sq Epi: x / Bacteria: x      CAPILLARY BLOOD GLUCOSE      POCT Blood Glucose.: 158 mg/dL (21 Jul 2023 06:41)  POCT Blood Glucose.: 146 mg/dL (20 Jul 2023 21:29)  POCT Blood Glucose.: 142 mg/dL (20 Jul 2023 17:32)  POCT Blood Glucose.: 161 mg/dL (20 Jul 2023 12:18)        RADIOLOGY & ADDITIONAL STUDIES:   SUBJECTIVE:  No events overnight. Pt tolerating PO intake; denies nausea/vomiting. Pt endorses having 2xBM s/p dulcolax suppository and endorses reduced sensation of constipation. +f/+bm. Denies CP, SOB.     MEDICATIONS  (STANDING):  dextrose 5%. 1000 milliLiter(s) (50 mL/Hr) IV Continuous <Continuous>  dextrose 5%. 1000 milliLiter(s) (100 mL/Hr) IV Continuous <Continuous>  dextrose 50% Injectable 25 Gram(s) IV Push once  dextrose 50% Injectable 25 Gram(s) IV Push once  dextrose 50% Injectable 12.5 Gram(s) IV Push once  glucagon  Injectable 1 milliGRAM(s) IntraMuscular once  heparin   Injectable 7500 Unit(s) SubCutaneous every 8 hours  insulin lispro (ADMELOG) corrective regimen sliding scale   SubCutaneous at bedtime  insulin lispro (ADMELOG) corrective regimen sliding scale   SubCutaneous three times a day before meals  lactated ringers. 1000 milliLiter(s) (150 mL/Hr) IV Continuous <Continuous>  levothyroxine 25 MICROGram(s) Oral daily  lidocaine   4% Patch 1 Patch Transdermal daily  pantoprazole  Injectable 40 milliGRAM(s) IV Push daily  thiamine IVPB 500 milliGRAM(s) IV Intermittent daily    MEDICATIONS  (PRN):  acetaminophen   Oral Liquid .. 1000 milliGRAM(s) Oral every 6 hours PRN Mild Pain (1 - 3), Moderate Pain (4 - 6)  dextrose Oral Gel 15 Gram(s) Oral once PRN Blood Glucose LESS THAN 70 milliGRAM(s)/deciliter  ondansetron Injectable 4 milliGRAM(s) IV Push every 6 hours PRN Nausea and/or Vomiting  oxyCODONE    Solution 5 milliGRAM(s) Oral every 6 hours PRN Severe Pain (7 - 10)      Vital Signs Last 24 Hrs  T(C): 36.7 (21 Jul 2023 04:47), Max: 37.1 (20 Jul 2023 08:46)  T(F): 98.1 (21 Jul 2023 04:47), Max: 98.8 (20 Jul 2023 08:46)  HR: 64 (21 Jul 2023 05:51) (60 - 69)  BP: 129/75 (21 Jul 2023 04:47) (98/54 - 143/64)  BP(mean): --  RR: 19 (21 Jul 2023 05:51) (16 - 19)  SpO2: 98% (21 Jul 2023 05:51) (93% - 99%)    Parameters below as of 21 Jul 2023 05:51  Patient On (Oxygen Delivery Method): nasal cannula  O2 Flow (L/min): 2      Physical Exam:  General: NAD, resting comfortably in bed  Pulmonary: Nonlabored breathing, no respiratory distress  Cardiovascular: NSR  Abdominal: soft, NT/ND  Extremities: WWP, normal strength  Neuro: A/O x 3, CNs II-XII grossly intact, no focal deficits    I&O's Summary    20 Jul 2023 07:01  -  21 Jul 2023 07:00  --------------------------------------------------------  IN: 3920 mL / OUT: 2200 mL / NET: 1720 mL        LABS:                        12.0   8.17  )-----------( 231      ( 20 Jul 2023 05:30 )             35.5     07-20    139  |  105  |  27<H>  ----------------------------<  190<H>  5.0   |  24  |  1.62<H>    Ca    9.0      20 Jul 2023 05:30  Phos  3.8     07-20  Mg     1.9     07-20        Urinalysis Basic - ( 20 Jul 2023 05:30 )    Color: x / Appearance: x / SG: x / pH: x  Gluc: 190 mg/dL / Ketone: x  / Bili: x / Urobili: x   Blood: x / Protein: x / Nitrite: x   Leuk Esterase: x / RBC: x / WBC x   Sq Epi: x / Non Sq Epi: x / Bacteria: x      CAPILLARY BLOOD GLUCOSE      POCT Blood Glucose.: 158 mg/dL (21 Jul 2023 06:41)  POCT Blood Glucose.: 146 mg/dL (20 Jul 2023 21:29)  POCT Blood Glucose.: 142 mg/dL (20 Jul 2023 17:32)  POCT Blood Glucose.: 161 mg/dL (20 Jul 2023 12:18)        RADIOLOGY & ADDITIONAL STUDIES:

## 2023-07-21 NOTE — PROGRESS NOTE ADULT - ATTENDING COMMENTS
CARMEN resolving   cont hold BP meds-- follow BP as outpt as above -- home monitor and with PCP asap  probably should not be on diuretic at home-- was on hyzaar

## 2023-07-21 NOTE — DISCHARGE NOTE PROVIDER - NSDCFUADDINST_GEN_ALL_CORE_FT
1) Please take Tylenol 650 mg every 4 to 6 hours by mouth for moderate pain control. Please do not exceed over 4,000 mg of Tylenol a day.  2) Please take oxycodone 5ml of 5mg/5ml oral solution every 6 hours for severe pain. Please do not exceed 20mg or 20ml of oxycodone 5mg/5ml solution in a 24 hour period.   3) Please take Protonix 40 mg once a day by mouth.  4) Please take your blood glucose level BEFORE taking insulin and take the appropriate amount of insulin for your blood sugar. Please follow up with your primary care provider in 1-week to manage your insulin regimen.     Please CRUSH ALL PILLS       Follow up with Dr. Marti in 1 week. Call the office at  to schedule your appointment. You may shower; soap and water over incision sites. Do not scrub. Pat dry when done. No tub bathing or swimming until cleared. Keep incision sites out of the sun as scars will darken. No heavy lifting (>10lbs) or strenuous exercise. Diet: Bariatric Full Fluids. 60 grams protein daily.  64 fluid ounces water daily. Drink small sips throughout the day. Continue diet as outlined by paperwork received as a pre-operative patient. You should be urinating at least 3-4x per day. Call the office if you experience increasing abdominal pain, nausea, vomiting, or temperature >100.4F.  NO ASPIRIN OR NSAIDs until approved by Dr. Marti. Avoid alcoholic beverages until cleared by Dr. Marti.   1) Please take Tylenol 650 mg every 4 to 6 hours by mouth for moderate pain control. Please do not exceed over 4,000 mg of Tylenol a day.  2) Please take oxycodone 5ml of 5mg/5ml oral solution every 6 hours for severe pain. Please do not exceed 20mg or 20ml of oxycodone 5mg/5ml solution in a 24 hour period.   3) Please take Protonix 40 mg once a day by mouth.  4) Please take your blood glucose level BEFORE taking insulin and if >180 please take your regular dose of Basaglar. Please take note of your sugars + insulin usage and follow up with your primary care provider in 1-week to manage your insulin regimen.     Please CRUSH ALL PILLS       Follow up with Dr. Marti in 1 week. Call the office at  to schedule your appointment. You may shower; soap and water over incision sites. Do not scrub. Pat dry when done. No tub bathing or swimming until cleared. Keep incision sites out of the sun as scars will darken. No heavy lifting (>10lbs) or strenuous exercise. Diet: Bariatric Full Fluids. 60 grams protein daily.  64 fluid ounces water daily. Drink small sips throughout the day. Continue diet as outlined by paperwork received as a pre-operative patient. You should be urinating at least 3-4x per day. Call the office if you experience increasing abdominal pain, nausea, vomiting, or temperature >100.4F.  NO ASPIRIN OR NSAIDs until approved by Dr. Marti. Avoid alcoholic beverages until cleared by Dr. Marti.

## 2023-07-21 NOTE — DISCHARGE NOTE PROVIDER - NSDCMRMEDTOKEN_GEN_ALL_CORE_FT
acetaminophen 160 mg/5 mL oral liquid: 20 milliliter(s) orally every 6 hours MDD: 80ML  B Complex 50: orally once a day  Basaglar KwikPen 100 units/mL subcutaneous solution: 50  subcutaneous once a day  clopidogrel 75 mg oral tablet: 1 tab(s) orally once a day  ketotifen 0.025% ophthalmic solution: 1 drop(s) to each affected eye every 8 hours  latanoprost ophthalmic: to each affected eye once a day  levothyroxine 25 mcg (0.025 mg) oral tablet: 1 tab(s) orally once a day  multivitamin: 1  orally once a day  oxyCODONE 5 mg/5 mL oral solution: 5 milliliter(s) orally every 6 hours MDD: 20ML  Protonix 40 mg oral granule, delayed release: 1 each orally once a day MDD: 1 tablet  simvastatin 20 mg oral tablet: 1 tab(s) orally once a day (at bedtime)  Vitamin D2: 1  orally once a day   acetaminophen 160 mg/5 mL oral liquid: 20 milliliter(s) orally every 6 hours MDD: 80ML  B Complex 50: orally once a day  Basaglar KwikPen 100 units/mL subcutaneous solution: 50  subcutaneous once a day  clopidogrel 75 mg oral tablet: 1 tab(s) orally once a day  ketotifen 0.025% ophthalmic solution: 1 drop(s) to each affected eye every 8 hours  latanoprost ophthalmic: to each affected eye once a day  levothyroxine 25 mcg (0.025 mg) oral tablet: 1 tab(s) orally once a day  multivitamin: 1  orally once a day  omeprazole 40 mg oral delayed release capsule: 1 cap(s) orally once a day MDD: 1 tablet  oxyCODONE 5 mg/5 mL oral solution: 5 milliliter(s) orally every 6 hours MDD: 20ML  simvastatin 20 mg oral tablet: 1 tab(s) orally once a day (at bedtime)  Vitamin D2: 1  orally once a day

## 2023-07-21 NOTE — DISCHARGE NOTE NURSING/CASE MANAGEMENT/SOCIAL WORK - PATIENT PORTAL LINK FT
You can access the FollowMyHealth Patient Portal offered by [facility] [place] by registering at the following website: [email or web address]. By joining [facility]’s FollowMyHealth portal, you will also be able to view your health information using other applications (apps) compatible with our system.

## 2023-07-21 NOTE — DISCHARGE NOTE PROVIDER - CARE PROVIDER_API CALL
David Marti  Surgery  [address] [place] [id number]  Phone: [phone]  Fax: [phone]  Follow Up Time: 1 week

## 2023-07-21 NOTE — DISCHARGE NOTE PROVIDER - HOSPITAL COURSE
62F with PMHx of obesity(BMI 36),CAD (stent x 2 in 2008/2009 on plavix), NELLIE (on CPAP), DM, HLD and HTN and PSHx of tubal ligation who presented on 7/19 for a robotic assisted  laparoscopic sleeve gastrectomy. Pt tolerated the procedure well. Patient admitted to the [facility] for post operative care. At time of discharge, pt was tolerating a bariatric clear liquid diet, and pt's pain was controlled. Plan is to follow up with Dr. Marti in the office.

## 2023-07-21 NOTE — DISCHARGE NOTE PROVIDER - NSDCFUSCHEDAPPT_GEN_ALL_CORE_FT
David Marti  [facility]  [facility] [address] [address]  Scheduled Appointment: 08/03/2023

## 2023-07-21 NOTE — PROGRESS NOTE ADULT - ASSESSMENT
62F with PMHx of obesity(BMI 36),CAD (stent x 2 in 2008/2009 on plavix), NELLIE (on CPAP), DM, HLD and HTN and PSHx of tubal ligation now s/p RA lap sleeve gastrectomy. Pt tolerating PO intake. Will resume home dose Plavix today.     start home plavix 75mg qd   CC BCLD/IVF  Pain/nausea control  ISS  CPAP  OOBA/SCD/IS  Lovenox POD 1  Nutrtion Consult  AM labs

## 2023-07-21 NOTE — PROGRESS NOTE ADULT - ASSESSMENT
INCOMPLETE    63 yo F w/o any hx of kidney disease, w/ HTN, DM admitted 7/19 for sleeve gastrectomy. Nephrology consulted for post-op CARMEN, Cr 2.03    #Non-oliguric CARMEN- RESOLVED  Likely prerenal etiology given her persistent use of BP meds despite decrease oral ingestion prior and post lap sleeve gastrotomy   BCr 0.8 7/5  Cr 2.03-->1.62 after IV fluids  UA 7/19 bland. UPCR 0.2  Abi 44 7/19  Etiology likely pre-renal disease      Recommend:   c/w IV hydration at 150 ml/hr  renal sono   strict I/Os   Hold antihypertensives. Maintain BP >110-120 for adequate renal perfusison 61 yo F w/o any hx of kidney disease, w/ HTN, DM admitted 7/19 for sleeve gastrectomy. Nephrology consulted for post-op CARMEN, Cr 2.03    #Non-oliguric CARMEN- RESOLVED  Likely prerenal etiology given her persistent use of BP meds despite decrease oral ingestion prior and post lap sleeve gastrotomy.   At home on: Norvas 5 mg daily, Toprol 100 mg daily (hx CAD) and Hyzaar (losartan/HCTZ) 100/25 daily   BCr 0.8 7/5  Cr 2.03-->improving today 1.04  UA 7/19 bland. UPCR 0.2  Abi 44 7/19    Recommend:   - c/w IV hydration at 150 ml/hr  - Monitor BP as patient with poor PO intake after sleeve gastrectomy   - continue to hold BP meds: Maintain BP >110-120 for adequate renal perfusions  - discharge w/o BP as currently on fluids and BP ranging /50-70  - upon discharge: patient advised to monitor BP at home and if SBP> 140 to start Norvasc 5 mg daily       - patient has home BP monitoring device   - Patient advised to call PCP Dr Apolinar Resendez for a f/u in 1 month        - patient should be in ACE/ARB given diabetes but current home regimen is Hyzaar  - strict I/Os    61 yo F w/o any hx of kidney disease, w/ HTN, DM admitted 7/19 for sleeve gastrectomy. Nephrology consulted for post-op CARMEN, Cr 2.03    #Non-oliguric CARMEN- RESOLVED  Likely prerenal etiology given her persistent use of BP meds despite decrease oral ingestion prior and post lap sleeve gastrotomy.   At home on: Norvas 5 mg daily, Toprol 100 mg daily (hx CAD) and Hyzaar (losartan/HCTZ) 100/25 daily   BCr 0.8 7/5  Cr 2.03-->improving today 1.04  UA 7/19 bland. UPCR 0.2  Abi 44 7/19  Renal US: No hydronephrosis, but patient w  and was unable to void at that time.    Recommend:   - Retention seen during Renal US improved, monitor with BS likely 2/2 scopolamine patch on 7/19  - c/w IV hydration at 150 ml/hr  - Monitor BP as patient with poor PO intake after sleeve gastrectomy   - continue to hold BP meds: Maintain BP >110-120 for adequate renal perfusions  - discharge w/o BP as currently on fluids and BP ranging /50-70  - upon discharge: patient advised to monitor BP at home and if SBP> 140 to start Norvasc 5 mg daily       - patient has home BP monitoring device   - Patient advised to call PCP Dr Apolinar Resendez for a f/u in 1 month        - patient should be in ACE/ARB given diabetes but current home regimen is Hyzaar  - strict I/Os    63 yo F w/o any hx of kidney disease, w/ HTN, DM admitted 7/19 for sleeve gastrectomy. Nephrology consulted for post-op CARMEN, Cr 2.03    #Non-oliguric CARMEN- RESOLVED  Likely prerenal etiology given her persistent use of BP meds despite decrease oral ingestion prior and post lap sleeve gastrotomy.   At home on: Norvas 5 mg daily, Toprol 100 mg daily (hx CAD) and Hyzaar (losartan/HCTZ) 100/25 daily   BCr 0.8 7/5  Cr 2.03-->improving today 1.04  UA 7/19 bland. UPCR 0.2  Abi 44 7/19  Renal US: No hydronephrosis, but patient w  and was unable to void at that time.    Recommend:   - Retention seen during Renal US improved, monitor with BS likely 2/2 scopolamine patch on 7/19  - c/w IV hydration at 150 ml/hr- can dc IVF when eating   - Monitor BP as patient with poor PO intake after sleeve gastrectomy   - continue to hold BP meds: Maintain BP >110-120 for adequate renal perfusions  - discharge w/o BP as currently on fluids and BP ranging /50-70  - upon discharge: patient advised to monitor BP at home and if SBP> 140 to start Norvasc 5 mg daily       - patient has home BP monitoring device   - Patient advised to call PCP Dr Apolinar Resendez for a f/u ASAP        - eventually  patient should be in ACE/ARB given diabetes but current home regimen is Hyzaar and may not want diuretic now  - strict I/Os

## 2023-07-26 DIAGNOSIS — G47.33 OBSTRUCTIVE SLEEP APNEA (ADULT) (PEDIATRIC): ICD-10-CM

## 2023-07-26 DIAGNOSIS — E66.01 MORBID (SEVERE) OBESITY DUE TO EXCESS CALORIES: ICD-10-CM

## 2023-07-26 DIAGNOSIS — I25.10 ATHEROSCLEROTIC HEART DISEASE OF NATIVE CORONARY ARTERY WITHOUT ANGINA PECTORIS: ICD-10-CM

## 2023-07-26 DIAGNOSIS — E03.9 HYPOTHYROIDISM, UNSPECIFIED: ICD-10-CM

## 2023-07-26 DIAGNOSIS — J45.909 UNSPECIFIED ASTHMA, UNCOMPLICATED: ICD-10-CM

## 2023-07-26 DIAGNOSIS — E78.5 HYPERLIPIDEMIA, UNSPECIFIED: ICD-10-CM

## 2023-07-26 DIAGNOSIS — Z95.5 PRESENCE OF CORONARY ANGIOPLASTY IMPLANT AND GRAFT: ICD-10-CM

## 2023-07-26 DIAGNOSIS — Z99.89 DEPENDENCE ON OTHER ENABLING MACHINES AND DEVICES: ICD-10-CM

## 2023-07-26 DIAGNOSIS — E86.1 HYPOVOLEMIA: ICD-10-CM

## 2023-07-26 DIAGNOSIS — N17.9 ACUTE KIDNEY FAILURE, UNSPECIFIED: ICD-10-CM

## 2023-07-26 DIAGNOSIS — E11.9 TYPE 2 DIABETES MELLITUS WITHOUT COMPLICATIONS: ICD-10-CM

## 2023-07-26 DIAGNOSIS — Z79.85 LONG-TERM (CURRENT) USE OF INJECTABLE NON-INSULIN ANTIDIABETIC DRUGS: ICD-10-CM

## 2023-07-26 DIAGNOSIS — Z79.02 LONG TERM (CURRENT) USE OF ANTITHROMBOTICS/ANTIPLATELETS: ICD-10-CM

## 2023-07-26 DIAGNOSIS — I10 ESSENTIAL (PRIMARY) HYPERTENSION: ICD-10-CM

## 2023-07-27 ENCOUNTER — APPOINTMENT (OUTPATIENT)
Dept: BARIATRICS | Facility: CLINIC | Age: 63
End: 2023-07-27
Payer: MEDICAID

## 2023-07-27 VITALS
BODY MASS INDEX: 34.23 KG/M2 | HEART RATE: 60 BPM | WEIGHT: 186 LBS | OXYGEN SATURATION: 97 % | DIASTOLIC BLOOD PRESSURE: 68 MMHG | SYSTOLIC BLOOD PRESSURE: 106 MMHG | TEMPERATURE: 97.5 F | HEIGHT: 62 IN

## 2023-07-27 PROCEDURE — 99024 POSTOP FOLLOW-UP VISIT: CPT

## 2023-07-27 NOTE — HISTORY OF PRESENT ILLNESS
[de-identified] : Pt is a 63 y/o F 1 week s/p VSG who presents today feeling well here for post op care. Pt denies any fevers, chest pn, sob, calf pain, n,v,c,d reflux, abd pn. Pt reports consuming 1 protein shake daily and 2 bottles of water daily. Pt has resumed her plavix daily since being d/c from the hospital. Pt is vit compliant. Reports walking daily for exercise and has lost 11 lbs since sx. Pt incisions are healing well and she denies any pain. Pt is overall very pleased with her progress since sx and has no concerns at this time.

## 2023-07-27 NOTE — PHYSICAL EXAM
[Obese] : obese [Normal] : affect appropriate [de-identified] : incisions healing well, dermabond intact. no evidence of bleeding, oozing, or infection

## 2023-07-27 NOTE — ASSESSMENT
[FreeTextEntry1] : Pt is a 63 y/o F 1 week s/p VSG who presents today feeling well here for post op care. Pt denies any fevers, chest pn, sob, calf pain, n,v,c,d reflux, abd pn. Pt reports consuming 1 protein shake daily and 2 bottles of water daily. Pt has resumed her plavix daily since being d/c from the hospital. Pt is vit compliant. Reports walking daily for exercise and has lost 11 lbs since sx. Pt incisions are healing well and she denies any pain. Pt is overall very pleased with her progress since sx and has no concerns at this time.

## 2023-08-02 LAB — SURGICAL PATHOLOGY STUDY: SIGNIFICANT CHANGE UP

## 2023-08-17 ENCOUNTER — APPOINTMENT (OUTPATIENT)
Dept: BARIATRICS | Facility: CLINIC | Age: 63
End: 2023-08-17
Payer: MEDICAID

## 2023-08-17 VITALS
DIASTOLIC BLOOD PRESSURE: 78 MMHG | HEIGHT: 62 IN | WEIGHT: 179 LBS | HEART RATE: 66 BPM | OXYGEN SATURATION: 100 % | TEMPERATURE: 97.2 F | SYSTOLIC BLOOD PRESSURE: 165 MMHG | BODY MASS INDEX: 32.94 KG/M2

## 2023-08-17 PROCEDURE — 99024 POSTOP FOLLOW-UP VISIT: CPT

## 2023-08-21 NOTE — PLAN
[FreeTextEntry1] : - 64 oz of water and 60 g protein daily  - Nutrition (progression to soft/solid food) - Start exercise  - f/u in 2 months

## 2023-08-21 NOTE — END OF VISIT
[Time Spent: ___ minutes] : I have spent [unfilled] minutes of time on the encounter. [FreeTextEntry3] : All medical record entries made by the Scribe were at my, NANDO York , direction and personally dictated by me on 08/17/2023 . I have reviewed the chart and agree that the record accurately reflects my personal performance of the history, physical exam, assessment and plan. I have also personally directed, reviewed, and agreed with the chart.

## 2023-08-21 NOTE — ASSESSMENT
[FreeTextEntry1] : Patient is a 61 y/o F 1 month s/p VSG on 7/19/23 who presents here today feeling well for post op care. Patient is doing generally well. She has been experiencing constipation and has not moved her bowels in last 4 days. Will have her start MOM for the facilitate her bowel movements. Tolerating her diet well. She has not transitioned to solid food and is currently on puree diet. Counseled on diet. Will have her consult with the nutritionist for diet progression. States she has been maintaining 64 oz. of fluid as recommended. Has been compliant on vitamins and supplements. BP during the visit was elevated at 165/78. Suggest patient to continue HTN medication at this time. Her last blood glucose this morning was reported to be 179. States she is on injection once a week for diabetes but is not sure about which medication. Patient last followed back with primary care about 3 days ago and was suggested to continue with diabetes medication. Will have her see the nutritionist to discuss progression of the diet. Will have return for follow up in 2 months.

## 2023-08-21 NOTE — HISTORY OF PRESENT ILLNESS
[de-identified] : Patient is a 61 y/o F 1 month s/p VSG on 7/19/23 who presents here today feeling well for post op care. Patient is doing generally well. She has been experiencing constipation and has not moved her bowels in last 4 days. Will have her start MOM for the facilitate her bowel movements. Tolerating her diet well. She has not transitioned to solid food and is currently on puree diet. Counseled on diet. Will have her consult with the nutritionist for diet progression. States she has been maintaining 64 oz. of fluid as recommended. Has been compliant on vitamins and supplements. BP during the visit was elevated at 165/78. Suggest patient to continue HTN medication at this time. Her last blood glucose this morning was reported to be 179. States she is on injection once a week for diabetes but is not sure about which medication. Patient last followed back with primary care about 3 days ago and was suggested to continue with diabetes medication. Will have her see the nutritionist to discuss progression of the diet. Will have return for follow up in 2 months.

## 2023-11-16 ENCOUNTER — APPOINTMENT (OUTPATIENT)
Dept: BARIATRICS | Facility: CLINIC | Age: 63
End: 2023-11-16
Payer: MEDICAID

## 2023-11-16 VITALS
DIASTOLIC BLOOD PRESSURE: 81 MMHG | SYSTOLIC BLOOD PRESSURE: 132 MMHG | HEIGHT: 62 IN | HEART RATE: 67 BPM | OXYGEN SATURATION: 100 % | WEIGHT: 155.38 LBS | TEMPERATURE: 97.2 F | BODY MASS INDEX: 28.59 KG/M2

## 2023-11-16 PROCEDURE — 99024 POSTOP FOLLOW-UP VISIT: CPT

## 2024-02-15 ENCOUNTER — APPOINTMENT (OUTPATIENT)
Dept: BARIATRICS | Facility: CLINIC | Age: 64
End: 2024-02-15
Payer: MEDICAID

## 2024-02-15 VITALS
DIASTOLIC BLOOD PRESSURE: 71 MMHG | OXYGEN SATURATION: 98 % | TEMPERATURE: 97.2 F | HEART RATE: 60 BPM | SYSTOLIC BLOOD PRESSURE: 145 MMHG | HEIGHT: 62 IN | WEIGHT: 147.38 LBS | BODY MASS INDEX: 27.12 KG/M2

## 2024-02-15 PROCEDURE — 99215 OFFICE O/P EST HI 40 MIN: CPT

## 2024-02-15 NOTE — ASSESSMENT
[FreeTextEntry1] : Pt is a 62 y/o F 6 mo s/p VSG who presents today for routine f/u. Her BMI today is 26 and she has lost 50 lbs since sx. She is doing well and denies n/v/d/ SOB, acid reflux, po intolerance, chest pain, abd pain, dizziness, fever and calf pain. She regularly exercises for 45 minutes a day and is also compliant with her vitamin intake. She occasionally experiences constipation and has been advised to increase her fiber intake or take Metamucil. She states that she's noticed a plateau in weight for the past 3 months and has been instructed to increase exercising and protein intake. She complains of loose skin that hangs from her abdomen and was advised to wear compression garments until 1 year post op. We will refer her to a plastic surgeon after that. She has no other concerns at this time. will meet the nutritionist today, get bloodwork done and f/u in 3 months.

## 2024-02-15 NOTE — END OF VISIT
[Time Spent: ___ minutes] : I have spent [unfilled] minutes of time on the encounter. [FreeTextEntry3] :  All medical record entries made by the Scribe were at my, NANDO [place] , direction and personally dictated by me on 02/15/2024 . I have reviewed the chart and agree that the record accurately reflects my personal performance of the history, physical exam, assessment and plan. I have also personally directed, reviewed, and agreed with the chart.

## 2024-02-15 NOTE — ADDENDUM
[FreeTextEntry1] :  Documented by Sherin Garcia acting as a scribe for NANDO Greenwood  on 02/15/2024.

## 2024-02-15 NOTE — HISTORY OF PRESENT ILLNESS
[de-identified] : Pt is a 62 y/o F 6 mo s/p VSG who presents today for routine f/u. Her BMI today is 26 and she has lost 50 lbs since sx. She is doing well and denies n/v/d/ SOB, acid reflux, po intolerance, chest pain, abd pain, dizziness, fever and calf pain. She regularly exercises for 45 minutes a day and is also compliant with her vitamin intake. She occasionally experiences constipation and has been advised to increase her fiber intake or take Metamucil. She states that she's noticed a plateau in weight for the past 3 months and has been instructed to increase exercising and protein intake. She complains of loose skin that hangs from her abdomen and was advised to wear compression garments until 1 year post op. We will refer her to a plastic surgeon after that. She has no other concerns at this time. will meet the nutritionist today, get bloodwork done and f/u in 3 months.

## 2024-06-13 ENCOUNTER — APPOINTMENT (OUTPATIENT)
Dept: BARIATRICS | Facility: CLINIC | Age: 64
End: 2024-06-13
Payer: MEDICAID

## 2024-06-13 VITALS
OXYGEN SATURATION: 97 % | HEART RATE: 67 BPM | HEIGHT: 62 IN | DIASTOLIC BLOOD PRESSURE: 77 MMHG | TEMPERATURE: 97.4 F | BODY MASS INDEX: 25.4 KG/M2 | SYSTOLIC BLOOD PRESSURE: 124 MMHG | WEIGHT: 138 LBS

## 2024-06-13 DIAGNOSIS — Z00.00 ENCOUNTER FOR GENERAL ADULT MEDICAL EXAMINATION W/OUT ABNORMAL FINDINGS: ICD-10-CM

## 2024-06-13 DIAGNOSIS — Z98.84 BARIATRIC SURGERY STATUS: ICD-10-CM

## 2024-06-13 PROCEDURE — 99214 OFFICE O/P EST MOD 30 MIN: CPT

## 2024-06-14 NOTE — ASSESSMENT
[FreeTextEntry1] : Pt is a 62 y/o F 10 mo s/p VSG who presents today feeling well here for routine f/u. Pt states she has lost 61 lbs since her sx which she is very happy wtih. Denies any n,v, reflux, abd pn. Pt states she moves her bowels 2-3x/week, discussed increasing fluid and fiber intake. States she is tolerating her diet without any issues or concerns. Pt states she is compliant with her daily vit and is walking for exercise. Recommended strength training 2-3x/week. No other concerns at this time.

## 2024-06-14 NOTE — HISTORY OF PRESENT ILLNESS
[de-identified] : Pt is a 64 y/o F 10 mo s/p VSG who presents today feeling well here for routine f/u. Pt states she has lost 61 lbs since her sx which she is very happy wtih. Denies any n,v, reflux, abd pn. Pt states she moves her bowels 2-3x/week, discussed increasing fluid and fiber intake. States she is tolerating her diet without any issues or concerns. Pt states she is compliant with her daily vit and is walking for exercise. Recommended strength training 2-3x/week. No other concerns at this time.

## 2024-06-21 ENCOUNTER — NON-APPOINTMENT (OUTPATIENT)
Age: 64
End: 2024-06-21

## 2024-06-21 LAB
25(OH)D3 SERPL-MCNC: 48.7 NG/ML
A-TOCOPHEROL VIT E SERPL-MCNC: 20.2 MG/L
ALBUMIN SERPL ELPH-MCNC: 4.3 G/DL
ALP BLD-CCNC: 54 U/L
ALT SERPL-CCNC: 18 U/L
ANION GAP SERPL CALC-SCNC: 11 MMOL/L
APTT BLD: 31.8 SEC
AST SERPL-CCNC: 16 U/L
BASOPHILS # BLD AUTO: 0.04 K/UL
BASOPHILS NFR BLD AUTO: 0.8 %
BETA+GAMMA TOCOPHEROL SERPL-MCNC: 0.4 MG/L
BILIRUB SERPL-MCNC: 0.3 MG/DL
BUN SERPL-MCNC: 27 MG/DL
CA-I SERPL-SCNC: 5 MG/DL
CALCIUM SERPL-MCNC: 9.8 MG/DL
CALCIUM SERPL-MCNC: 9.8 MG/DL
CHLORIDE SERPL-SCNC: 102 MMOL/L
CHOLEST SERPL-MCNC: 193 MG/DL
CO2 SERPL-SCNC: 26 MMOL/L
CREAT SERPL-MCNC: 0.72 MG/DL
EGFR: 93 ML/MIN/1.73M2
EOSINOPHIL # BLD AUTO: 0.05 K/UL
EOSINOPHIL NFR BLD AUTO: 1 %
ESTIMATED AVERAGE GLUCOSE: 180 MG/DL
FOLATE SERPL-MCNC: >20 NG/ML
GLUCOSE SERPL-MCNC: 120 MG/DL
HBA1C MFR BLD HPLC: 7.9 %
HCT VFR BLD CALC: 36.2 %
HDLC SERPL-MCNC: 78 MG/DL
HGB BLD-MCNC: 12.1 G/DL
IMM GRANULOCYTES NFR BLD AUTO: 0.2 %
INR PPP: 0.93 RATIO
IRON SATN MFR SERPL: 32 %
IRON SERPL-MCNC: 94 UG/DL
LDLC SERPL CALC-MCNC: 105 MG/DL
LYMPHOCYTES # BLD AUTO: 1.07 K/UL
LYMPHOCYTES NFR BLD AUTO: 21.1 %
MAN DIFF?: NORMAL
MCHC RBC-ENTMCNC: 30.9 PG
MCHC RBC-ENTMCNC: 33.4 GM/DL
MCV RBC AUTO: 92.3 FL
MONOCYTES # BLD AUTO: 0.35 K/UL
MONOCYTES NFR BLD AUTO: 6.9 %
NEUTROPHILS # BLD AUTO: 3.54 K/UL
NEUTROPHILS NFR BLD AUTO: 70 %
NONHDLC SERPL-MCNC: 115 MG/DL
PARATHYROID HORMONE INTACT: 38 PG/ML
PLATELET # BLD AUTO: 286 K/UL
POTASSIUM SERPL-SCNC: 4.8 MMOL/L
PREALB SERPL NEPH-MCNC: 21 MG/DL
PROT SERPL-MCNC: 6.5 G/DL
PT BLD: 10.5 SEC
RBC # BLD: 3.92 M/UL
RBC # FLD: 13.4 %
SODIUM SERPL-SCNC: 139 MMOL/L
TIBC SERPL-MCNC: 296 UG/DL
TRIGL SERPL-MCNC: 55 MG/DL
TSH SERPL-ACNC: 1.9 UIU/ML
UIBC SERPL-MCNC: 202 UG/DL
VIT A SERPL-MCNC: 54.9 UG/DL
VIT B1 SERPL-MCNC: 100.1 NMOL/L
VIT B12 SERPL-MCNC: 1065 PG/ML
WBC # FLD AUTO: 5.06 K/UL
ZINC SERPL-MCNC: 78 UG/DL

## 2024-08-02 NOTE — BRIEF OPERATIVE NOTE - NSICDXBRIEFPREOP_GEN_ALL_CORE_FT
Pt with R ankle pain and swelling. Pain 8/10. Denies nausea and vomiting, chest pain, and shortness of breath. Bed on lowest position and locked. Call light within reach.    
Rounded on patient. Patient resting comfortably in bed. Breathing evenly and unlabored. Easily arousable. No concerns at this time. Bed on lowest position and locked. Call light within reach.    
US at bedside.  
PRE-OP DIAGNOSIS:  Morbid obesity 19-Jul-2023 13:51:58  Junior Dudley

## 2024-09-12 ENCOUNTER — APPOINTMENT (OUTPATIENT)
Dept: BARIATRICS | Facility: CLINIC | Age: 64
End: 2024-09-12
Payer: MEDICAID

## 2024-09-12 VITALS
WEIGHT: 293 LBS | OXYGEN SATURATION: 98 % | TEMPERATURE: 97.4 F | BODY MASS INDEX: 53.92 KG/M2 | DIASTOLIC BLOOD PRESSURE: 77 MMHG | SYSTOLIC BLOOD PRESSURE: 130 MMHG | HEART RATE: 59 BPM | HEIGHT: 62 IN

## 2024-09-12 DIAGNOSIS — Z98.84 BARIATRIC SURGERY STATUS: ICD-10-CM

## 2024-09-12 PROCEDURE — 99214 OFFICE O/P EST MOD 30 MIN: CPT

## 2024-09-12 NOTE — ASSESSMENT
[FreeTextEntry1] : Patient is a 64 y/o F 14 months s/p VSG who presents today for a follow up. BMI today is 25 and has lost alost 60 lbs since surgery. Patient is doing really well and denies n/v/d/ SOB, acid reflux, po intolerance, chest pain, abd pain, dizziness, fever and calf pain. Mentions occasional constipation but nothing of severe concern. Patient is compliant with vitamins and bikes for exercise. Reviewed the importance of adhering to lifestyle modifications to maintain weight loss and prevent muscle or bone loss. Patient has requested plastic referral for possible abdominoplasty. No other concerns at this time. Will follow up in 6 months.

## 2024-09-12 NOTE — ADDENDUM
[FreeTextEntry1] :  Documented by Sherin Garcia acting as a scribe for NANDO Greenwood  on 09/12/2024  .

## 2024-09-12 NOTE — END OF VISIT
[Time Spent: ___ minutes] : I have spent [unfilled] minutes of time on the encounter which excludes teaching and separately reported services. [FreeTextEntry3] :  All medical record entries made by the Scribe were at my, NANDO York , direction and personally dictated by me on 09/12/2024 . I have reviewed the chart and agree that the record accurately reflects my personal performance of the history, physical exam, assessment and plan. I have also personally directed, reviewed, and agreed with the chart.

## 2024-09-12 NOTE — HISTORY OF PRESENT ILLNESS
[de-identified] : Patient is a 64 y/o F 14 months s/p VSG who presents today for a follow up. BMI today is 25 and has lost alost 60 lbs since surgery. Patient is doing really well and denies n/v/d/ SOB, acid reflux, po intolerance, chest pain, abd pain, dizziness, fever and calf pain. Mentions occasional constipation but nothing of severe concern. Patient is compliant with vitamins and bikes for exercise. Reviewed the importance of adhering to lifestyle modifications to maintain weight loss and prevent muscle or bone loss. Patient has requested plastic referral for possible abdominoplasty. No other concerns at this time. Will follow up in 6 months.

## 2025-03-20 ENCOUNTER — APPOINTMENT (OUTPATIENT)
Dept: BARIATRICS | Facility: CLINIC | Age: 65
End: 2025-03-20

## 2025-04-24 ENCOUNTER — NON-APPOINTMENT (OUTPATIENT)
Age: 65
End: 2025-04-24

## 2025-04-24 ENCOUNTER — APPOINTMENT (OUTPATIENT)
Dept: BARIATRICS | Facility: CLINIC | Age: 65
End: 2025-04-24
Payer: MEDICAID

## 2025-04-24 VITALS
TEMPERATURE: 97.2 F | SYSTOLIC BLOOD PRESSURE: 121 MMHG | HEIGHT: 60 IN | HEART RATE: 54 BPM | OXYGEN SATURATION: 98 % | WEIGHT: 135.2 LBS | BODY MASS INDEX: 26.55 KG/M2 | DIASTOLIC BLOOD PRESSURE: 71 MMHG

## 2025-04-24 DIAGNOSIS — Z98.84 BARIATRIC SURGERY STATUS: ICD-10-CM

## 2025-04-24 DIAGNOSIS — Z00.00 ENCOUNTER FOR GENERAL ADULT MEDICAL EXAMINATION W/OUT ABNORMAL FINDINGS: ICD-10-CM

## 2025-04-24 PROCEDURE — 99214 OFFICE O/P EST MOD 30 MIN: CPT

## 2025-05-02 LAB
25(OH)D3 SERPL-MCNC: 45.1 NG/ML
A-TOCOPHEROL VIT E SERPL-MCNC: 11.4 MG/L
ANION GAP SERPL CALC-SCNC: 13 MMOL/L
APPEARANCE: CLEAR
BACTERIA: NEGATIVE /HPF
BASOPHILS # BLD AUTO: 0.06 K/UL
BASOPHILS NFR BLD AUTO: 0.7 %
BETA+GAMMA TOCOPHEROL SERPL-MCNC: 0.6 MG/L
BILIRUBIN URINE: NEGATIVE
BLOOD URINE: NEGATIVE
BUN SERPL-MCNC: 26 MG/DL
CA-I SERPL-SCNC: 5.1 MG/DL
CALCIUM SERPL-MCNC: 9.2 MG/DL
CALCIUM SERPL-MCNC: 9.2 MG/DL
CAST: 0 /LPF
CHLORIDE SERPL-SCNC: 106 MMOL/L
CHOLEST SERPL-MCNC: 153 MG/DL
CO2 SERPL-SCNC: 25 MMOL/L
COLOR: YELLOW
CREAT SERPL-MCNC: 0.67 MG/DL
EGFRCR SERPLBLD CKD-EPI 2021: 97 ML/MIN/1.73M2
EOSINOPHIL # BLD AUTO: 0.12 K/UL
EOSINOPHIL NFR BLD AUTO: 1.5 %
EPITHELIAL CELLS: 10 /HPF
ESTIMATED AVERAGE GLUCOSE: 163 MG/DL
FOLATE SERPL-MCNC: 17.8 NG/ML
GLUCOSE QUALITATIVE U: NEGATIVE MG/DL
GLUCOSE SERPL-MCNC: 60 MG/DL
HBA1C MFR BLD HPLC: 7.3 %
HCG SERPL QL: NEGATIVE
HCT VFR BLD CALC: 35.1 %
HDLC SERPL-MCNC: 68 MG/DL
HGB BLD-MCNC: 11.5 G/DL
IMM GRANULOCYTES NFR BLD AUTO: 0.1 %
INR PPP: 0.9 RATIO
IRON SATN MFR SERPL: 42 %
IRON SERPL-MCNC: 126 UG/DL
KETONES URINE: NEGATIVE MG/DL
LDLC SERPL-MCNC: 72 MG/DL
LEUKOCYTE ESTERASE URINE: ABNORMAL
LYMPHOCYTES # BLD AUTO: 1.68 K/UL
LYMPHOCYTES NFR BLD AUTO: 20.8 %
MAN DIFF?: NORMAL
MCHC RBC-ENTMCNC: 30.7 PG
MCHC RBC-ENTMCNC: 32.8 G/DL
MCV RBC AUTO: 93.9 FL
MICROSCOPIC-UA: NORMAL
MONOCYTES # BLD AUTO: 0.57 K/UL
MONOCYTES NFR BLD AUTO: 7 %
NEUTROPHILS # BLD AUTO: 5.65 K/UL
NEUTROPHILS NFR BLD AUTO: 69.9 %
NITRITE URINE: NEGATIVE
NONHDLC SERPL-MCNC: 85 MG/DL
PAPP-A SERPL-ACNC: 2 MIU/ML
PARATHYROID HORMONE INTACT: 37 PG/ML
PH URINE: 7
PLATELET # BLD AUTO: 303 K/UL
POTASSIUM SERPL-SCNC: 4.5 MMOL/L
PREALB SERPL NEPH-MCNC: 24 MG/DL
PROTEIN URINE: NEGATIVE MG/DL
PT BLD: 10.6 SEC
RBC # BLD: 3.74 M/UL
RBC # FLD: 13.9 %
RED BLOOD CELLS URINE: 0 /HPF
REVIEW: NORMAL
SODIUM SERPL-SCNC: 144 MMOL/L
SPECIFIC GRAVITY URINE: 1.02
TIBC SERPL-MCNC: 303 UG/DL
TRIGL SERPL-MCNC: 62 MG/DL
TSH SERPL-ACNC: 2.28 UIU/ML
UIBC SERPL-MCNC: 177 UG/DL
UREA BREATH TEST QL: NEGATIVE
UROBILINOGEN URINE: 0.2 MG/DL
VIT A SERPL-MCNC: 54.6 UG/DL
VIT B1 SERPL-MCNC: 150.5 NMOL/L
VIT B12 SERPL-MCNC: >2000 PG/ML
WBC # FLD AUTO: 8.09 K/UL
WHITE BLOOD CELLS URINE: 2 /HPF
ZINC SERPL-MCNC: 65 UG/DL

## 2025-07-24 ENCOUNTER — APPOINTMENT (OUTPATIENT)
Dept: BARIATRICS | Facility: CLINIC | Age: 65
End: 2025-07-24

## 2025-08-14 ENCOUNTER — APPOINTMENT (OUTPATIENT)
Dept: BARIATRICS | Facility: CLINIC | Age: 65
End: 2025-08-14

## 2025-08-15 ENCOUNTER — APPOINTMENT (OUTPATIENT)
Dept: BARIATRICS | Facility: CLINIC | Age: 65
End: 2025-08-15

## 2025-08-15 DIAGNOSIS — Z98.84 BARIATRIC SURGERY STATUS: ICD-10-CM

## 2025-08-15 PROCEDURE — 99215 OFFICE O/P EST HI 40 MIN: CPT

## (undated) DEVICE — PACK GENERAL LAPAROSCOPY

## (undated) DEVICE — POSITIONER FOAM EGG CRATE ULNAR 2PCS (PINK)

## (undated) DEVICE — WARMING BLANKET UPPER ADULT

## (undated) DEVICE — TROCAR COVIDIEN VERSAONE BLUNT TIP HASSAN 12MM

## (undated) DEVICE — SUT MONOCRYL 4-0 18" PS-2

## (undated) DEVICE — ELCTR BOVIE PENCIL BLADE 10FT

## (undated) DEVICE — TROCAR COVIDIEN VERSAONE FIXATION CANNULA 5MM

## (undated) DEVICE — ENDOCATCH 10MM SPECIMEN POUCH

## (undated) DEVICE — GLV 7.5 PROTEXIS (WHITE)

## (undated) DEVICE — TUBING STRYKER HYSTEROSCOPY INFLOW OUTFLOW

## (undated) DEVICE — MYOSURE SOCK

## (undated) DEVICE — DRAPE IRRIGATION POUCH 19X23"

## (undated) DEVICE — GLV 7 PROTEXIS (WHITE)

## (undated) DEVICE — PREP CHLORAPREP HI-LITE ORANGE 26ML

## (undated) DEVICE — TROCAR COVIDIEN VERSAPORT BLADELESS OPTICAL 5MM STANDARD

## (undated) DEVICE — POSITIONER STRAP ARMBOARD 1.5X32" DISP

## (undated) DEVICE — SUT VICRYL 0 54" TIES

## (undated) DEVICE — LIGASURE MARYLAND 37CM

## (undated) DEVICE — MARKING PEN W RULER

## (undated) DEVICE — BLADE SURGICAL #15 CARBON

## (undated) DEVICE — SOL INJ NS 0.9% 1000ML

## (undated) DEVICE — FLUENT FMS PROCEDURE KIT

## (undated) DEVICE — Device

## (undated) DEVICE — DRAPE TOWEL BLUE 17" X 24"

## (undated) DEVICE — TUBING STRYKER PNEUMOSURE HI FLOW INSUFFLATOR

## (undated) DEVICE — DRSG DERMABOND 0.7ML

## (undated) DEVICE — STAPLER COVIDIEN ENDO GIA XL HANDLE

## (undated) DEVICE — TIP METZENBAUM SCISSOR MONOPOLAR ENDOCUT (ORANGE)

## (undated) DEVICE — PACK D&C

## (undated) DEVICE — SOL IRR BAG NS 0.9% 3000ML

## (undated) DEVICE — XI STAPLER SUREFORM 60

## (undated) DEVICE — XI VESSEL SEALER

## (undated) DEVICE — SUT VICRYL 0 27" UR-6

## (undated) DEVICE — SLV COMPRESSION KNEE MED

## (undated) DEVICE — DRSG PAD SANITARY OB

## (undated) DEVICE — D HELP - CLEARVIEW CLEARIFY SYSTEM

## (undated) DEVICE — MYOSURE SCOPE SEAL

## (undated) DEVICE — VENODYNE/SCD SLEEVE CALF MEDIUM

## (undated) DEVICE — TUBING SUCTION 20FT